# Patient Record
Sex: FEMALE | Race: BLACK OR AFRICAN AMERICAN | NOT HISPANIC OR LATINO | Employment: OTHER | URBAN - METROPOLITAN AREA
[De-identification: names, ages, dates, MRNs, and addresses within clinical notes are randomized per-mention and may not be internally consistent; named-entity substitution may affect disease eponyms.]

---

## 2023-04-24 LAB — CALCIDIOL (25 OH VITAMIN D3) (NG/ML) IN SER/PLAS: 31 NG/ML

## 2023-10-03 DIAGNOSIS — I10 HYPERTENSION, UNSPECIFIED TYPE: Primary | ICD-10-CM

## 2023-10-03 RX ORDER — TRIAMTERENE AND HYDROCHLOROTHIAZIDE 37.5; 25 MG/1; MG/1
1 CAPSULE ORAL DAILY
Qty: 90 CAPSULE | Refills: 0 | Status: SHIPPED | OUTPATIENT
Start: 2023-10-03 | End: 2024-01-02

## 2023-10-03 RX ORDER — TRIAMTERENE AND HYDROCHLOROTHIAZIDE 37.5; 25 MG/1; MG/1
1 CAPSULE ORAL DAILY
COMMUNITY
Start: 2019-10-28 | End: 2023-10-03 | Stop reason: SDUPTHER

## 2023-10-10 DIAGNOSIS — E55.9 VITAMIN D DEFICIENCY: ICD-10-CM

## 2023-10-10 DIAGNOSIS — E78.5 HYPERLIPIDEMIA, UNSPECIFIED HYPERLIPIDEMIA TYPE: Primary | ICD-10-CM

## 2023-10-31 ENCOUNTER — TELEPHONE (OUTPATIENT)
Dept: GASTROENTEROLOGY | Facility: CLINIC | Age: 68
End: 2023-10-31

## 2023-10-31 ENCOUNTER — LAB (OUTPATIENT)
Dept: LAB | Facility: LAB | Age: 68
End: 2023-10-31
Payer: MEDICARE

## 2023-10-31 DIAGNOSIS — R10.13 EPIGASTRIC PAIN: Primary | ICD-10-CM

## 2023-10-31 DIAGNOSIS — R10.13 EPIGASTRIC PAIN: ICD-10-CM

## 2023-10-31 PROCEDURE — 85652 RBC SED RATE AUTOMATED: CPT

## 2023-10-31 PROCEDURE — 36415 COLL VENOUS BLD VENIPUNCTURE: CPT

## 2023-10-31 PROCEDURE — 85027 COMPLETE CBC AUTOMATED: CPT

## 2023-10-31 PROCEDURE — 80053 COMPREHEN METABOLIC PANEL: CPT

## 2023-10-31 PROCEDURE — 83690 ASSAY OF LIPASE: CPT

## 2023-10-31 RX ORDER — OMEPRAZOLE 40 MG/1
40 CAPSULE, DELAYED RELEASE ORAL DAILY
Qty: 30 CAPSULE | Refills: 11 | Status: SHIPPED | OUTPATIENT
Start: 2023-10-31 | End: 2024-10-30

## 2023-10-31 NOTE — TELEPHONE ENCOUNTER
Phone - abdominal cramping x 1 month - intermittent - in epigastrium - worse after eating - no N or V - good appetite - BM's regular - rec: labs, omeprazole 40  mg daily - appt.

## 2023-11-01 LAB
ALBUMIN SERPL BCP-MCNC: 4.2 G/DL (ref 3.4–5)
ALP SERPL-CCNC: 112 U/L (ref 33–136)
ALT SERPL W P-5'-P-CCNC: 15 U/L (ref 7–45)
ANION GAP SERPL CALC-SCNC: 14 MMOL/L (ref 10–20)
AST SERPL W P-5'-P-CCNC: 14 U/L (ref 9–39)
BILIRUB SERPL-MCNC: 0.5 MG/DL (ref 0–1.2)
BUN SERPL-MCNC: 12 MG/DL (ref 6–23)
CALCIUM SERPL-MCNC: 9.5 MG/DL (ref 8.6–10.6)
CHLORIDE SERPL-SCNC: 102 MMOL/L (ref 98–107)
CO2 SERPL-SCNC: 28 MMOL/L (ref 21–32)
CREAT SERPL-MCNC: 0.93 MG/DL (ref 0.5–1.05)
ERYTHROCYTE [DISTWIDTH] IN BLOOD BY AUTOMATED COUNT: 12.6 % (ref 11.5–14.5)
ERYTHROCYTE [SEDIMENTATION RATE] IN BLOOD BY WESTERGREN METHOD: 35 MM/H (ref 0–30)
GFR SERPL CREATININE-BSD FRML MDRD: 67 ML/MIN/1.73M*2
GLUCOSE SERPL-MCNC: 98 MG/DL (ref 74–99)
HCT VFR BLD AUTO: 36.8 % (ref 36–46)
HGB BLD-MCNC: 11.8 G/DL (ref 12–16)
LIPASE SERPL-CCNC: 20 U/L (ref 9–82)
MCH RBC QN AUTO: 30.8 PG (ref 26–34)
MCHC RBC AUTO-ENTMCNC: 32.1 G/DL (ref 32–36)
MCV RBC AUTO: 96 FL (ref 80–100)
NRBC BLD-RTO: 0 /100 WBCS (ref 0–0)
PLATELET # BLD AUTO: 255 X10*3/UL (ref 150–450)
PMV BLD AUTO: 10.2 FL (ref 7.5–11.5)
POTASSIUM SERPL-SCNC: 3.7 MMOL/L (ref 3.5–5.3)
PROT SERPL-MCNC: 7.7 G/DL (ref 6.4–8.2)
RBC # BLD AUTO: 3.83 X10*6/UL (ref 4–5.2)
SODIUM SERPL-SCNC: 140 MMOL/L (ref 136–145)
WBC # BLD AUTO: 6.6 X10*3/UL (ref 4.4–11.3)

## 2023-11-02 NOTE — RESULT ENCOUNTER NOTE
"I called patient - \"much better\" - pain has resolved - still with \"gas, belching\" - on omeprazole 40 mg daily - rec: call me in 2 weeks "

## 2023-11-17 ENCOUNTER — TELEPHONE (OUTPATIENT)
Dept: GASTROENTEROLOGY | Facility: CLINIC | Age: 68
End: 2023-11-17
Payer: MEDICARE

## 2023-11-17 DIAGNOSIS — R10.9 ABDOMINAL PAIN, UNSPECIFIED ABDOMINAL LOCATION: Primary | ICD-10-CM

## 2023-11-17 NOTE — TELEPHONE ENCOUNTER
----- Message from Catherine Alaniz MA sent at 11/17/2023 10:03 AM EST -----  [phone]  calling with an update

## 2023-11-20 ENCOUNTER — ANCILLARY PROCEDURE (OUTPATIENT)
Dept: RADIOLOGY | Facility: CLINIC | Age: 68
End: 2023-11-20
Payer: MEDICARE

## 2023-11-20 DIAGNOSIS — R10.9 ABDOMINAL PAIN, UNSPECIFIED ABDOMINAL LOCATION: ICD-10-CM

## 2023-11-20 PROCEDURE — 74177 CT ABD & PELVIS W/CONTRAST: CPT | Performed by: RADIOLOGY

## 2023-11-20 PROCEDURE — 2550000001 HC RX 255 CONTRASTS: Performed by: INTERNAL MEDICINE

## 2023-11-20 PROCEDURE — 74177 CT ABD & PELVIS W/CONTRAST: CPT

## 2023-11-20 RX ADMIN — IOHEXOL 75 ML: 350 INJECTION, SOLUTION INTRAVENOUS at 10:29

## 2023-11-21 NOTE — RESULT ENCOUNTER NOTE
I called patient - reviewed CT - sludge in GB - had another episode of upper abd pain after eating last night - I rec: appt. With Wm re; ? Lap armando?

## 2023-11-30 ENCOUNTER — LAB (OUTPATIENT)
Dept: LAB | Facility: LAB | Age: 68
End: 2023-11-30
Payer: MEDICARE

## 2023-11-30 DIAGNOSIS — E55.9 VITAMIN D DEFICIENCY: ICD-10-CM

## 2023-11-30 DIAGNOSIS — E78.5 HYPERLIPIDEMIA, UNSPECIFIED HYPERLIPIDEMIA TYPE: ICD-10-CM

## 2023-11-30 LAB
25(OH)D3 SERPL-MCNC: 23 NG/ML (ref 30–100)
ALBUMIN SERPL BCP-MCNC: 4.3 G/DL (ref 3.4–5)
ALP SERPL-CCNC: 102 U/L (ref 33–136)
ALT SERPL W P-5'-P-CCNC: 17 U/L (ref 7–45)
ANION GAP SERPL CALC-SCNC: 13 MMOL/L (ref 10–20)
APPEARANCE UR: ABNORMAL
AST SERPL W P-5'-P-CCNC: 15 U/L (ref 9–39)
BACTERIA #/AREA URNS AUTO: ABNORMAL /HPF
BILIRUB SERPL-MCNC: 0.6 MG/DL (ref 0–1.2)
BILIRUB UR STRIP.AUTO-MCNC: NEGATIVE MG/DL
BUN SERPL-MCNC: 18 MG/DL (ref 6–23)
CALCIUM SERPL-MCNC: 9.7 MG/DL (ref 8.6–10.6)
CHLORIDE SERPL-SCNC: 104 MMOL/L (ref 98–107)
CHOLEST SERPL-MCNC: 199 MG/DL (ref 0–199)
CHOLESTEROL/HDL RATIO: 4.1
CO2 SERPL-SCNC: 29 MMOL/L (ref 21–32)
COLOR UR: YELLOW
CREAT SERPL-MCNC: 1.03 MG/DL (ref 0.5–1.05)
ERYTHROCYTE [DISTWIDTH] IN BLOOD BY AUTOMATED COUNT: 12.6 % (ref 11.5–14.5)
GFR SERPL CREATININE-BSD FRML MDRD: 59 ML/MIN/1.73M*2
GLUCOSE SERPL-MCNC: 111 MG/DL (ref 74–99)
GLUCOSE UR STRIP.AUTO-MCNC: NEGATIVE MG/DL
HCT VFR BLD AUTO: 38.9 % (ref 36–46)
HDLC SERPL-MCNC: 48.6 MG/DL
HGB BLD-MCNC: 12.6 G/DL (ref 12–16)
HOLD SPECIMEN: NORMAL
KETONES UR STRIP.AUTO-MCNC: NEGATIVE MG/DL
LDLC SERPL CALC-MCNC: 127 MG/DL
LEUKOCYTE ESTERASE UR QL STRIP.AUTO: ABNORMAL
MCH RBC QN AUTO: 30.7 PG (ref 26–34)
MCHC RBC AUTO-ENTMCNC: 32.4 G/DL (ref 32–36)
MCV RBC AUTO: 95 FL (ref 80–100)
MUCOUS THREADS #/AREA URNS AUTO: ABNORMAL /LPF
NITRITE UR QL STRIP.AUTO: NEGATIVE
NON HDL CHOLESTEROL: 150 MG/DL (ref 0–149)
NRBC BLD-RTO: 0 /100 WBCS (ref 0–0)
PH UR STRIP.AUTO: 6 [PH]
PLATELET # BLD AUTO: 247 X10*3/UL (ref 150–450)
POTASSIUM SERPL-SCNC: 4.2 MMOL/L (ref 3.5–5.3)
PROT SERPL-MCNC: 7 G/DL (ref 6.4–8.2)
PROT UR STRIP.AUTO-MCNC: NEGATIVE MG/DL
RBC # BLD AUTO: 4.1 X10*6/UL (ref 4–5.2)
RBC # UR STRIP.AUTO: ABNORMAL /UL
RBC #/AREA URNS AUTO: >20 /HPF
RENAL EPI CELLS #/AREA UR COMP ASSIST: ABNORMAL /HPF
SODIUM SERPL-SCNC: 142 MMOL/L (ref 136–145)
SP GR UR STRIP.AUTO: 1.02
SQUAMOUS #/AREA URNS AUTO: ABNORMAL /HPF
TRIGL SERPL-MCNC: 117 MG/DL (ref 0–149)
TSH SERPL-ACNC: 2.24 MIU/L (ref 0.44–3.98)
UROBILINOGEN UR STRIP.AUTO-MCNC: <2 MG/DL
VLDL: 23 MG/DL (ref 0–40)
WBC # BLD AUTO: 6.2 X10*3/UL (ref 4.4–11.3)
WBC #/AREA URNS AUTO: >50 /HPF

## 2023-11-30 PROCEDURE — 85027 COMPLETE CBC AUTOMATED: CPT

## 2023-11-30 PROCEDURE — 36415 COLL VENOUS BLD VENIPUNCTURE: CPT

## 2023-11-30 PROCEDURE — 84443 ASSAY THYROID STIM HORMONE: CPT

## 2023-11-30 PROCEDURE — 82306 VITAMIN D 25 HYDROXY: CPT

## 2023-11-30 PROCEDURE — 80053 COMPREHEN METABOLIC PANEL: CPT

## 2023-11-30 PROCEDURE — 81001 URINALYSIS AUTO W/SCOPE: CPT

## 2023-11-30 PROCEDURE — 80061 LIPID PANEL: CPT

## 2023-12-04 ENCOUNTER — APPOINTMENT (OUTPATIENT)
Dept: GASTROENTEROLOGY | Facility: CLINIC | Age: 68
End: 2023-12-04
Payer: MEDICARE

## 2023-12-04 ENCOUNTER — OFFICE VISIT (OUTPATIENT)
Dept: GASTROENTEROLOGY | Facility: CLINIC | Age: 68
End: 2023-12-04
Payer: MEDICARE

## 2023-12-04 VITALS — HEIGHT: 64 IN | WEIGHT: 198 LBS | BODY MASS INDEX: 33.8 KG/M2 | HEART RATE: 72 BPM

## 2023-12-04 DIAGNOSIS — R73.9 HYPERGLYCEMIA: Primary | ICD-10-CM

## 2023-12-04 DIAGNOSIS — E55.9 VITAMIN D DEFICIENCY: ICD-10-CM

## 2023-12-04 DIAGNOSIS — I10 PRIMARY HYPERTENSION: Primary | ICD-10-CM

## 2023-12-04 PROCEDURE — 1159F MED LIST DOCD IN RCRD: CPT | Performed by: INTERNAL MEDICINE

## 2023-12-04 PROCEDURE — 1126F AMNT PAIN NOTED NONE PRSNT: CPT | Performed by: INTERNAL MEDICINE

## 2023-12-04 PROCEDURE — 1036F TOBACCO NON-USER: CPT | Performed by: INTERNAL MEDICINE

## 2023-12-04 PROCEDURE — 99214 OFFICE O/P EST MOD 30 MIN: CPT | Performed by: INTERNAL MEDICINE

## 2023-12-04 RX ORDER — OXYCODONE AND ACETAMINOPHEN 5; 325 MG/1; MG/1
1 TABLET ORAL EVERY 6 HOURS PRN
COMMUNITY
End: 2023-12-21 | Stop reason: ALTCHOICE

## 2023-12-04 RX ORDER — ONDANSETRON 4 MG/1
4 TABLET, ORALLY DISINTEGRATING ORAL EVERY 12 HOURS PRN
COMMUNITY
End: 2023-12-21 | Stop reason: ALTCHOICE

## 2023-12-04 RX ORDER — FLUOCINOLONE ACETONIDE 0.11 MG/ML
1 OIL AURICULAR (OTIC) AS NEEDED
COMMUNITY
Start: 2023-03-16 | End: 2024-05-08 | Stop reason: ALTCHOICE

## 2023-12-04 RX ORDER — FLUTICASONE PROPIONATE 50 MCG
2 SPRAY, SUSPENSION (ML) NASAL DAILY
COMMUNITY
Start: 2023-03-16 | End: 2023-12-21 | Stop reason: ALTCHOICE

## 2023-12-04 RX ORDER — ESTRADIOL 0.1 MG/G
CREAM VAGINAL AS NEEDED
COMMUNITY
Start: 2023-04-25 | End: 2024-04-29 | Stop reason: SDUPTHER

## 2023-12-04 RX ORDER — HYDROCORTISONE 25 MG/G
1 CREAM TOPICAL AS NEEDED
COMMUNITY
End: 2024-05-08 | Stop reason: ALTCHOICE

## 2023-12-04 RX ORDER — CROMOLYN SODIUM 40 MG/ML
1 SOLUTION/ DROPS OPHTHALMIC 4 TIMES DAILY
COMMUNITY
End: 2023-12-21 | Stop reason: ALTCHOICE

## 2023-12-04 ASSESSMENT — ENCOUNTER SYMPTOMS
CARDIOVASCULAR NEGATIVE: 1
DIARRHEA: 0
MUSCULOSKELETAL NEGATIVE: 1
CONSTIPATION: 0
CONSTITUTIONAL NEGATIVE: 1
EYES NEGATIVE: 1
NEUROLOGICAL NEGATIVE: 1
PSYCHIATRIC NEGATIVE: 1
RESPIRATORY NEGATIVE: 1

## 2023-12-04 NOTE — PROGRESS NOTES
Subjective     History of Present Illness:   Heide Russ is a 68 y.o. female with PMHx of hypertension who presents to GI clinic for physical exam.     Review of Systems  Review of Systems   Constitutional: Negative.    HENT: Negative.     Eyes: Negative.    Respiratory: Negative.     Cardiovascular: Negative.         Home BP's not being monitored    Gastrointestinal:  Negative for constipation and diarrhea.        Still with episodes of epigastric pain.   Some relief with Mylanta.   Not much relief with omeprazole.   Has appt. With Dr. Burk 12/8.   Genitourinary: Negative.  Negative for vaginal discharge.   Musculoskeletal: Negative.         Occasional left knee pain   Neurological: Negative.    Psychiatric/Behavioral: Negative.         Allergies  No Known Allergies    Medications  Current Outpatient Medications   Medication Instructions    cromolyn (Opticrom) 4 % ophthalmic solution 1 drop, Both Eyes, 4 times daily    estradiol (Estrace) 0.01 % (0.1 mg/gram) vaginal cream PLACE DIME SIZED DROP OF OINTMENT VAGINALLY NIGHTLY FOR 2 WEEKS AND THEN 3 TIMES WEEKLY THEREAFTER    fluocinolone (DermOtic Oil) 0.01 % ear drops 1 drop, Each Ear, 2 times daily    fluticasone (Flonase) 50 mcg/actuation nasal spray 2 sprays, Each Nostril, Daily    hydrocortisone 2.5 % cream 1 Application, Topical, As needed    omeprazole (PRILOSEC) 40 mg, oral, Daily, Do not crush or chew.    ondansetron ODT (ZOFRAN-ODT) 4 mg, oral, Every 12 hours PRN    oxyCODONE-acetaminophen (Percocet) 5-325 mg tablet 1 tablet, oral, Every 6 hours PRN    triamterene-hydrochlorothiazid (Dyazide) 37.5-25 mg capsule 1 capsule, oral, Daily        Objective   Visit Vitals  Pulse 72   138/82    Physical Exam  HENT:      Nose: Nose normal.   Eyes:      Extraocular Movements: Extraocular movements intact.   Cardiovascular:      Rate and Rhythm: Regular rhythm. Tachycardia present.      Pulses: Normal pulses.   Pulmonary:      Breath sounds: Normal breath sounds.    Abdominal:      General: Bowel sounds are normal.      Palpations: Abdomen is soft.      Comments: Abdomen nontender    Neurological:      Mental Status: She is alert.           Lab Results   Component Value Date    WBC 6.2 11/30/2023    WBC 6.6 10/31/2023    WBC 7.6 11/14/2022    HGB 12.6 11/30/2023    HGB 11.8 (L) 10/31/2023    HGB 12.2 11/14/2022    HCT 38.9 11/30/2023    HCT 36.8 10/31/2023    HCT 36.9 11/14/2022     11/30/2023     10/31/2023     11/14/2022     Lab Results   Component Value Date     11/30/2023     10/31/2023     11/14/2022    K 4.2 11/30/2023    K 3.7 10/31/2023    K 4.1 11/14/2022     11/30/2023     10/31/2023     11/14/2022    CO2 29 11/30/2023    CO2 28 10/31/2023    CO2 26 11/14/2022    BUN 18 11/30/2023    BUN 12 10/31/2023    BUN 19 11/14/2022    CREATININE 1.03 11/30/2023    CREATININE 0.93 10/31/2023    CREATININE 1.00 11/14/2022    CALCIUM 9.7 11/30/2023    CALCIUM 9.5 10/31/2023    CALCIUM 9.5 11/14/2022    PROT 7.0 11/30/2023    PROT 7.7 10/31/2023    PROT 7.7 11/14/2022    BILITOT 0.6 11/30/2023    BILITOT 0.5 10/31/2023    BILITOT 0.8 11/14/2022    ALKPHOS 102 11/30/2023    ALKPHOS 112 10/31/2023    ALKPHOS 121 11/14/2022    ALT 17 11/30/2023    ALT 15 10/31/2023    ALT 17 11/14/2022    AST 15 11/30/2023    AST 14 10/31/2023    AST 15 11/14/2022    GLUCOSE 111 (H) 11/30/2023    GLUCOSE 98 10/31/2023    GLUCOSE 99 11/14/2022    CHOL 199 11/30/2023    CHOL 192 11/14/2022    CHOL 196 10/13/2021    LDLF 120 (H) 11/14/2022    LDLF 122 (H) 10/13/2021    LDLF 141 (H) 10/12/2020    CHHDL 4.1 11/30/2023    CHHDL 3.8 11/14/2022    CHHDL 4.0 10/13/2021     CT abdomen pelvis w IV contrast  Narrative: Interpreted By:  Luther Connor,   STUDY:  CT ABDOMEN PELVIS W IV CONTRAST;  11/20/2023 10:27 am      INDICATION:  Signs/Symptoms:abd pain, elevated sed rate.      COMPARISON:  Right upper quadrant ultrasound 10 February 2010; complete  abdominal  ultrasound 23 January 2008      ACCESSION NUMBER(S):  [id number]      ORDERING CLINICIAN:  VINNY KEY      TECHNIQUE:  CT of the abdomen and pelvis from the lung bases through the  symphysis pubis after the uneventful administration of intravenous  contrast (75 mL Omnipaque 350). No oral contrast.      FINDINGS:  LOWER CHEST: No acute airspace disease.      BONES: No acute skeletal findings.      LIVER: Lobular contour, but not overly cirrhotic. Not enlarged or  overtly fatty. No mass. All vessels patent      SPLEEN: Normal. No enlargement, mass or evidence of splenic vein  thrombosis.      PANCREAS: Normal. No CT evidence of acute or chronic pancreatitis. No  duct dilation. No mass.      GALLBLADDER: Borderline to mildly dilated. No wall thickening or  surrounding inflammatory change. Probability of layering sludge if  not tiny, like stones      BILE DUCTS: Normal. No biliary duct dilation.      ADRENAL GLANDS: Normal. No nodule or mass.      KIDNEYS AND URETERS: 27 mm exophytic upper pole and a smaller lower  pole cyst are both simple, Bosniak type 1, do not need follow-up. No  hydronephrosis on either side.  No solid mass on either side.  Symmetric enhancement.  No infarct or CT evidence of acute  pyelonephritis.  No substantial radiodense stone.  Tiny stones and  radiolucent stones could be occult on CT.      LYMPH NODES: Few mildly enlarged gastrohepatic ligament and aryan  hepatis nodes. The largest is the 13 mm short axis aryan hepatis node  on axial 37. No other remarkable lymph node stations      APPENDIX: Normal.  Not dilated, thick walled or in any other way  inflamed in appearance.  No inflammatory change about the appendix.      COLON: Normal. No sign of acute diverticulitis or other colitis. No  annular constricting mass.      SMALL BOWEL: Normal. No small bowel dilation or any other sign of  small bowel obstruction. No sign of active inflammatory bowel disease.      STOMACH /  DUODENUM: Grossly normal by CT which has limited  sensitivity and specificity for the stomach and duodenum.      RETROPERITONEUM: Normal.  No acute hemorrhage or inflammatory change.  Lymph nodes in a separate dedicated section.      OMENTUM, MESENTERY AND PERITONEAL SPACES:  Free intraperitoneal air: Negative  Free intraperitoneal fluid: Negative  Abscess: Negative  Other: n/a      URINARY BLADDER: Normal. No wall thickening, large diverticula,  radiodense stone or surrounding inflammatory change.      PELVIS: 18 mm simple cystic structure in deep left hemipelvis is  either associated with the left side of the vaginal cuff or possibly  the left ovary      VASCULATURE: No abdominal aortic or any other named major arterial  abdominal aneurysm. No high-grade atherosclerotic or other stenosis      ABDOMINAL WALL:  Hernia: Negative  Other: No acute or contributory abnormality.      Impression: The gallbladder is borderline to mildly dilated and contains sludge  if not tiny layering sand-like stones, but no gallbladder wall  thickening or surrounding pericholecystic inflammatory change      Incidental finding of mild adenopathy at the gastrohepatic ligament  and aryan hepatis. This may be reactive to the liver. The liver is  slightly lobular but not overtly nodular/cirrhotic. The liver is not  enlarged or overtly fatty. All hepatic vasculature is patent      No other potentially acute findings in the abdomen or pelvis      No acute appendicitis, diverticulitis, other colitis, bowel  obstruction, perforation, abscess or free fluid      No acute pancreatitis, hydronephrosis or any other acute solid organ  findings      No biliary duct dilation      MACRO:  Critical Finding:  See findings. Notification was initiated on  11/20/2023 at 11:26 am by  Luther Connor.  (**-YCF-**) Instructions:      Signed by: Luther Connor 11/20/2023 11:26 AM  Dictation workstation:   [id number]           Heide Russ is a 68 y.o. female for  physical exam.   Abdominal discomfort persists, has [place] appt. 12/8.   Glucose elevated, will reduce CHO intake, recheck in March.   Vit D low, will restart supplement, recheck in March.        Tin Scott MD

## 2023-12-08 ENCOUNTER — HOSPITAL ENCOUNTER (OUTPATIENT)
Facility: HOSPITAL | Age: 68
Setting detail: OUTPATIENT SURGERY
End: 2023-12-08
Attending: SURGERY | Admitting: SURGERY
Payer: MEDICARE

## 2023-12-08 ENCOUNTER — OFFICE VISIT (OUTPATIENT)
Dept: SURGERY | Facility: CLINIC | Age: 68
End: 2023-12-08
Payer: MEDICARE

## 2023-12-08 VITALS — WEIGHT: 194 LBS | BODY MASS INDEX: 32.32 KG/M2 | HEIGHT: 65 IN

## 2023-12-08 DIAGNOSIS — K80.20 CALCULUS OF GALLBLADDER WITHOUT CHOLECYSTITIS WITHOUT OBSTRUCTION: Primary | ICD-10-CM

## 2023-12-08 DIAGNOSIS — K80.20 CALCULUS OF GALLBLADDER WITHOUT CHOLECYSTITIS WITHOUT OBSTRUCTION: ICD-10-CM

## 2023-12-08 PROCEDURE — 1036F TOBACCO NON-USER: CPT | Performed by: SURGERY

## 2023-12-08 PROCEDURE — 1126F AMNT PAIN NOTED NONE PRSNT: CPT | Performed by: SURGERY

## 2023-12-08 PROCEDURE — 1159F MED LIST DOCD IN RCRD: CPT | Performed by: SURGERY

## 2023-12-08 PROCEDURE — 99203 OFFICE O/P NEW LOW 30 MIN: CPT | Performed by: SURGERY

## 2023-12-08 ASSESSMENT — PAIN SCALES - GENERAL: PAINLEVEL: 0-NO PAIN

## 2023-12-08 NOTE — LETTER
"December 8, 2023     Tin Scott MD  [address] [facility]  [facility] [address]    Patient: Heide Russ   YOB: 1955   Date of Visit: 12/8/2023       Dear Dr. Tin Scott MD:    Thank you for referring Heide Russ to me for evaluation. Below are my notes for this consultation.  If you have questions, please do not hesitate to call me. I look forward to following your patient along with you.       Sincerely,     Barrett Burk MD      CC: No Recipients  ______________________________________________________________________________________    Subjective   Patient ID: Heide Russ is a 68 y.o. female who presents for Abdominal Pain.  HPI  Ms. Russ is a very pleasant 68-year-old female who was referred for evaluation treatment of symptomatic gallstones.  Over the past several months he has noted intermittent epigastric abdominal pain that sometimes radiates to the chest.  She has had some nausea with this.  Her symptoms are made worse after meals.  Roughly 1 month ago she had a particularly bad attack after eating some cheese.  She was sent for a CT scan that shows gallstones with an enlarged gallbladder.  She feels that her symptoms of pain after eating have become more frequent of late.  She is oftentimes afraid to eat. These symptoms are different from her \"heartburn\"  and prescribed PPIs have not been helping.        Review of Systems  On review of systems patient denies any weight loss, fever, change in bowel habits, melena, hematochezia, coronary artery disease, hypertension, TIA/CVA, bleeding, jaundice, pancreatitis, hepatitis.    Patient does not smoke.    Past Medical History:   Diagnosis Date   • Encounter for fitting and adjustment of other specified devices 03/17/2021    Fitting and adjustment of pessary   • Encounter for follow-up examination after completed treatment for conditions other than malignant neoplasm 06/22/2021    Postop check "   • Presence of urogenital implants 03/17/2021    Vaginal pessary present    HTN    Past Surgical History:   Procedure Laterality Date   • HYSTERECTOMY  05/24/2013    Hysterectomy   • OTHER SURGICAL HISTORY  12/28/2020    Breast reduction   • OTHER SURGICAL HISTORY  05/25/2021    Sacrospinous fixation of vaginal vault   • OTHER SURGICAL HISTORY  05/25/2021    Cystocele repair            Objective     Mild obese,  well-developed. In no distress  Alert and oriented x 3  Lungs are clear to auscultation bilaterally.  Cardiac exam is regular rhythm and rate.  Abdomen is soft nontender nondistended.  Neurologic exam is without focal deficit.         CT ABDOMEN PELVIS W IV CONTRAST  11/27/2023  - Impression -  The gallbladder is borderline to mildly dilated and contains sludge  if not tiny layering sand-like stones, but no gallbladder wall  thickening or surrounding pericholecystic inflammatory change    Incidental finding of mild adenopathy at the gastrohepatic ligament  and aryan hepatis. This may be reactive to the liver. The liver is  slightly lobular but not overtly nodular/cirrhotic. The liver is not  enlarged or overtly fatty. All hepatic vasculature is patent    No other potentially acute findings in the abdomen or pelvis    No acute appendicitis, diverticulitis, other colitis, bowel  obstruction, perforation, abscess or free fluid    No acute pancreatitis, hydronephrosis or any other acute solid organ  findings    No biliary duct dilation      Lab Results   Component Value Date    GLUCOSE 111 (H) 11/30/2023     11/30/2023    K 4.2 11/30/2023     11/30/2023    CO2 29 11/30/2023    ANIONGAP 13 11/30/2023    BUN 18 11/30/2023    CREATININE 1.03 11/30/2023    EGFR 59 (L) 11/30/2023    CALCIUM 9.7 11/30/2023    ALBUMIN 4.3 11/30/2023    ALKPHOS 102 11/30/2023    PROT 7.0 11/30/2023    AST 15 11/30/2023    BILITOT 0.6 11/30/2023    ALT 17 11/30/2023         Assessment/Plan   Diagnoses and all orders for this  visit:  Calculus of gallbladder without cholecystitis without obstruction  -     Case Request Operating Room: Cholecystectomy Laparoscopy; Standing    Impression:   68-year-old female with recurrent epigastric abdominal pain and nausea made worse after meals.  CT scan shows gallstones.  I recommended laparoscopic cholecystectomy.  We discussed the procedure to include risk, benefits and alternatives.  Also discussed with her son who is on the phone.  She agrees to surgery which is tentatively scheduled for early next month.  She instructed to give me a call should she have worsening of her symptoms and we can get her in sooner

## 2023-12-08 NOTE — PROGRESS NOTES
"Subjective   Patient ID: Heide Russ is a 68 y.o. female who presents for Abdominal Pain.  HPI  Ms. Russ is a very pleasant 68-year-old female who was referred for evaluation treatment of symptomatic gallstones.  Over the past several months he has noted intermittent epigastric abdominal pain that sometimes radiates to the chest.  She has had some nausea with this.  Her symptoms are made worse after meals.  Roughly 1 month ago she had a particularly bad attack after eating some cheese.  She was sent for a CT scan that shows gallstones with an enlarged gallbladder.  She feels that her symptoms of pain after eating have become more frequent of late.  She is oftentimes afraid to eat. These symptoms are different from her \"heartburn\"  and prescribed PPIs have not been helping.        Review of Systems  On review of systems patient denies any weight loss, fever, change in bowel habits, melena, hematochezia, coronary artery disease, hypertension, TIA/CVA, bleeding, jaundice, pancreatitis, hepatitis.    Patient does not smoke.    Past Medical History:   Diagnosis Date    Encounter for fitting and adjustment of other specified devices 03/17/2021    Fitting and adjustment of pessary    Encounter for follow-up examination after completed treatment for conditions other than malignant neoplasm 06/22/2021    Postop check    Presence of urogenital implants 03/17/2021    Vaginal pessary present    HTN    Past Surgical History:   Procedure Laterality Date    HYSTERECTOMY  05/24/2013    Hysterectomy    OTHER SURGICAL HISTORY  12/28/2020    Breast reduction    OTHER SURGICAL HISTORY  05/25/2021    Sacrospinous fixation of vaginal vault    OTHER SURGICAL HISTORY  05/25/2021    Cystocele repair            Objective     Mild obese,  well-developed. In no distress  Alert and oriented x 3  Lungs are clear to auscultation bilaterally.  Cardiac exam is regular rhythm and rate.  Abdomen is soft nontender nondistended.  Neurologic exam is " without focal deficit.         CT ABDOMEN PELVIS W IV CONTRAST  11/27/2023  - Impression -  The gallbladder is borderline to mildly dilated and contains sludge  if not tiny layering sand-like stones, but no gallbladder wall  thickening or surrounding pericholecystic inflammatory change    Incidental finding of mild adenopathy at the gastrohepatic ligament  and aryan hepatis. This may be reactive to the liver. The liver is  slightly lobular but not overtly nodular/cirrhotic. The liver is not  enlarged or overtly fatty. All hepatic vasculature is patent    No other potentially acute findings in the abdomen or pelvis    No acute appendicitis, diverticulitis, other colitis, bowel  obstruction, perforation, abscess or free fluid    No acute pancreatitis, hydronephrosis or any other acute solid organ  findings    No biliary duct dilation      Lab Results   Component Value Date    GLUCOSE 111 (H) 11/30/2023     11/30/2023    K 4.2 11/30/2023     11/30/2023    CO2 29 11/30/2023    ANIONGAP 13 11/30/2023    BUN 18 11/30/2023    CREATININE 1.03 11/30/2023    EGFR 59 (L) 11/30/2023    CALCIUM 9.7 11/30/2023    ALBUMIN 4.3 11/30/2023    ALKPHOS 102 11/30/2023    PROT 7.0 11/30/2023    AST 15 11/30/2023    BILITOT 0.6 11/30/2023    ALT 17 11/30/2023         Assessment/Plan   Diagnoses and all orders for this visit:  Calculus of gallbladder without cholecystitis without obstruction  -     Case Request Operating Room: Cholecystectomy Laparoscopy; Standing    Impression:   68-year-old female with recurrent epigastric abdominal pain and nausea made worse after meals.  CT scan shows gallstones.  I recommended laparoscopic cholecystectomy.  We discussed the procedure to include risk, benefits and alternatives.  Also discussed with her son who is on the phone.  She agrees to surgery which is tentatively scheduled for early next month.  She instructed to give me a call should she have worsening of her symptoms and we can get  her in sooner

## 2023-12-12 ENCOUNTER — TELEPHONE (OUTPATIENT)
Dept: GASTROENTEROLOGY | Facility: CLINIC | Age: 68
End: 2023-12-12
Payer: MEDICARE

## 2023-12-12 NOTE — TELEPHONE ENCOUNTER
Phone - some improvement in upper abd pain on omeprazole, with more careful diet - I rec: increase to 40 mg daily, call me after Xmas

## 2023-12-12 NOTE — TELEPHONE ENCOUNTER
----- Message from Catherine Alaniz MA sent at 12/12/2023 10:07 AM EST -----  [phone] please call regarding Gallbladder surgery

## 2023-12-21 ENCOUNTER — TELEMEDICINE CLINICAL SUPPORT (OUTPATIENT)
Dept: PREADMISSION TESTING | Facility: HOSPITAL | Age: 68
End: 2023-12-21
Payer: MEDICARE

## 2023-12-21 RX ORDER — CHOLECALCIFEROL (VITAMIN D3) 25 MCG
1000 TABLET ORAL DAILY
COMMUNITY
End: 2024-05-08 | Stop reason: ALTCHOICE

## 2023-12-21 RX ORDER — DEXTROMETHORPHAN HYDROBROMIDE, GUAIFENESIN 5; 100 MG/5ML; MG/5ML
650 LIQUID ORAL EVERY 8 HOURS PRN
COMMUNITY
End: 2024-05-08 | Stop reason: ALTCHOICE

## 2023-12-21 RX ORDER — SIMETHICONE 80 MG
80 TABLET,CHEWABLE ORAL EVERY 6 HOURS PRN
COMMUNITY
End: 2024-05-08 | Stop reason: ALTCHOICE

## 2023-12-22 ENCOUNTER — ANCILLARY PROCEDURE (OUTPATIENT)
Dept: RADIOLOGY | Facility: CLINIC | Age: 68
End: 2023-12-22
Payer: MEDICARE

## 2023-12-22 DIAGNOSIS — Z00.00 ENCOUNTER FOR GENERAL ADULT MEDICAL EXAMINATION WITHOUT ABNORMAL FINDINGS: ICD-10-CM

## 2023-12-22 PROCEDURE — 77063 BREAST TOMOSYNTHESIS BI: CPT | Mod: BILATERAL PROCEDURE | Performed by: RADIOLOGY

## 2023-12-22 PROCEDURE — 77067 SCR MAMMO BI INCL CAD: CPT

## 2023-12-22 PROCEDURE — 77067 SCR MAMMO BI INCL CAD: CPT | Mod: BILATERAL PROCEDURE | Performed by: RADIOLOGY

## 2023-12-26 ENCOUNTER — PRE-ADMISSION TESTING (OUTPATIENT)
Dept: PREADMISSION TESTING | Facility: HOSPITAL | Age: 68
End: 2023-12-26
Payer: MEDICARE

## 2023-12-30 DIAGNOSIS — I10 HYPERTENSION, UNSPECIFIED TYPE: ICD-10-CM

## 2024-01-02 RX ORDER — TRIAMTERENE AND HYDROCHLOROTHIAZIDE 37.5; 25 MG/1; MG/1
1 CAPSULE ORAL DAILY
Qty: 90 CAPSULE | Refills: 2 | Status: SHIPPED | OUTPATIENT
Start: 2024-01-02

## 2024-01-12 ENCOUNTER — TELEPHONE (OUTPATIENT)
Dept: GASTROENTEROLOGY | Facility: CLINIC | Age: 69
End: 2024-01-12
Payer: MEDICARE

## 2024-01-12 NOTE — TELEPHONE ENCOUNTER
Phone - she cancelled her lap armando - has been feeling better, no recurrence of the episodes of abd pain - following a low fat diet - will stay on omeprazole 40 mg daily, call me early February

## 2024-01-12 NOTE — TELEPHONE ENCOUNTER
----- Message from Kayleen Aragon CMA sent at 1/12/2024  1:22 PM EST -----  Wants to update you please call   [phone]

## 2024-03-05 ENCOUNTER — TELEPHONE (OUTPATIENT)
Dept: GASTROENTEROLOGY | Facility: CLINIC | Age: 69
End: 2024-03-05
Payer: MEDICARE

## 2024-03-05 DIAGNOSIS — Z80.0 FAMILY HISTORY OF COLON CANCER: ICD-10-CM

## 2024-03-05 DIAGNOSIS — K21.00 GASTROESOPHAGEAL REFLUX DISEASE WITH ESOPHAGITIS WITHOUT HEMORRHAGE: Primary | ICD-10-CM

## 2024-03-05 NOTE — TELEPHONE ENCOUNTER
----- Message from Catherine Alaniz MA sent at 3/5/2024  9:49 AM EST -----  Please put an order in for Colonoscopy also please call regarding reflux [phone]

## 2024-03-06 DIAGNOSIS — Z80.0 FAMILY HISTORY OF MALIGNANT NEOPLASM OF GASTROINTESTINAL TRACT: ICD-10-CM

## 2024-03-06 RX ORDER — SOD SULF/POT CHLORIDE/MAG SULF 1.479 G
TABLET ORAL
Qty: 24 TABLET | Refills: 0 | Status: SHIPPED | OUTPATIENT
Start: 2024-03-06 | End: 2024-05-08 | Stop reason: ALTCHOICE

## 2024-03-28 PROBLEM — J32.9 SINUSITIS: Status: ACTIVE | Noted: 2024-03-28

## 2024-03-28 PROBLEM — L57.9 SKIN CHANGES DUE TO CHRONIC EXPOSURE TO NONIONIZING RADIATION, UNSPECIFIED: Status: ACTIVE | Noted: 2022-06-01

## 2024-03-28 PROBLEM — I77.810 DILATED AORTIC ROOT (CMS-HCC): Status: ACTIVE | Noted: 2024-03-28

## 2024-03-28 PROBLEM — E55.9 VITAMIN D DEFICIENCY: Status: ACTIVE | Noted: 2023-11-30

## 2024-03-28 PROBLEM — N89.8 GRANULATION TISSUE OF VAGINA: Status: ACTIVE | Noted: 2024-03-28

## 2024-03-28 PROBLEM — D18.01 HEMANGIOMA OF SKIN AND SUBCUTANEOUS TISSUE: Status: ACTIVE | Noted: 2022-06-01

## 2024-03-28 PROBLEM — N99.3 VAGINAL VAULT PROLAPSE, POSTHYSTERECTOMY: Status: ACTIVE | Noted: 2023-04-25

## 2024-03-28 PROBLEM — J30.2 SEASONAL ALLERGIC RHINITIS: Status: ACTIVE | Noted: 2020-07-28

## 2024-03-28 PROBLEM — M84.369A STRESS FRACTURE OF TIBIA: Status: ACTIVE | Noted: 2024-03-28

## 2024-03-28 PROBLEM — L85.3 XEROSIS CUTIS: Status: ACTIVE | Noted: 2022-06-01

## 2024-03-28 PROBLEM — H60.549 ECZEMA OF EXTERNAL AUDITORY CANAL: Status: ACTIVE | Noted: 2021-03-02

## 2024-03-28 PROBLEM — L81.4 OTHER MELANIN HYPERPIGMENTATION: Status: ACTIVE | Noted: 2022-06-01

## 2024-03-28 PROBLEM — L30.9 DERMATITIS, UNSPECIFIED: Status: ACTIVE | Noted: 2022-06-01

## 2024-03-28 PROBLEM — E78.5 HYPERLIPIDEMIA: Status: ACTIVE | Noted: 2024-03-28

## 2024-03-28 PROBLEM — R10.2 FEMALE PELVIC PAIN: Status: ACTIVE | Noted: 2023-04-25

## 2024-03-28 PROBLEM — N81.89 PELVIC FLOOR WEAKNESS: Status: ACTIVE | Noted: 2024-03-28

## 2024-03-28 PROBLEM — Z86.79 HISTORY OF HYPERTENSION: Status: ACTIVE | Noted: 2024-03-28

## 2024-03-28 PROBLEM — R73.9 HYPERGLYCEMIA: Status: ACTIVE | Noted: 2024-03-28

## 2024-03-28 PROBLEM — L82.1 OTHER SEBORRHEIC KERATOSIS: Status: ACTIVE | Noted: 2022-06-01

## 2024-04-02 ENCOUNTER — OFFICE VISIT (OUTPATIENT)
Dept: GASTROENTEROLOGY | Facility: EXTERNAL LOCATION | Age: 69
End: 2024-04-02
Payer: MEDICARE

## 2024-04-02 ENCOUNTER — TELEPHONE (OUTPATIENT)
Dept: GASTROENTEROLOGY | Facility: CLINIC | Age: 69
End: 2024-04-02

## 2024-04-02 DIAGNOSIS — Z80.0 FAMILY HISTORY OF COLON CANCER: ICD-10-CM

## 2024-04-02 DIAGNOSIS — Z80.0 FAMILY HISTORY OF MALIGNANT NEOPLASM OF GASTROINTESTINAL TRACT: ICD-10-CM

## 2024-04-02 DIAGNOSIS — K21.00 GASTROESOPHAGEAL REFLUX DISEASE WITH ESOPHAGITIS WITHOUT HEMORRHAGE: ICD-10-CM

## 2024-04-02 DIAGNOSIS — K57.30 DIVERTICULOSIS OF LARGE INTESTINE WITHOUT DIVERTICULITIS: ICD-10-CM

## 2024-04-02 DIAGNOSIS — I10 ESSENTIAL HYPERTENSION, BENIGN: ICD-10-CM

## 2024-04-02 DIAGNOSIS — D49.0 GASTRIC NEOPLASM: ICD-10-CM

## 2024-04-02 DIAGNOSIS — D12.2 BENIGN NEOPLASM OF ASCENDING COLON: ICD-10-CM

## 2024-04-02 DIAGNOSIS — R10.13 ABDOMINAL PAIN, EPIGASTRIC: ICD-10-CM

## 2024-04-02 DIAGNOSIS — K31.89 SUBEPITHELIAL MASS OF STOMACH: Primary | ICD-10-CM

## 2024-04-02 DIAGNOSIS — D12.4 BENIGN NEOPLASM OF DESCENDING COLON: ICD-10-CM

## 2024-04-02 DIAGNOSIS — K31.7 GASTRIC POLYP: Primary | ICD-10-CM

## 2024-04-02 PROCEDURE — 88305 TISSUE EXAM BY PATHOLOGIST: CPT

## 2024-04-02 PROCEDURE — 45385 COLONOSCOPY W/LESION REMOVAL: CPT | Performed by: INTERNAL MEDICINE

## 2024-04-02 PROCEDURE — 88305 TISSUE EXAM BY PATHOLOGIST: CPT | Performed by: STUDENT IN AN ORGANIZED HEALTH CARE EDUCATION/TRAINING PROGRAM

## 2024-04-02 PROCEDURE — 43235 EGD DIAGNOSTIC BRUSH WASH: CPT | Performed by: INTERNAL MEDICINE

## 2024-04-02 NOTE — PROGRESS NOTES
Continued Stay Note   [place]     Patient Name: Odalis Solo  MRN: [id number]  Today's Date: 9/22/2023    Admit Date: 9/21/2023    Plan: home with [facility]- ordered and accepted.   Discharge Plan       Row Name 09/22/23 1055       Plan    Plan home with [facility]- ordered and accepted.    Plan Comments  set up per ortho navigator.  has RW at home.  verified with HH that all was good with them.                      Expected Discharge Date and Time       Expected Discharge Date Expected Discharge Time    Sep 22, 2023               Leni Mcallister RN     See provation

## 2024-04-02 NOTE — PROGRESS NOTES
Phone - EUS ordered d/t apparent submucosal mass - will stay on omeprazole 40 mg every other day -

## 2024-04-03 ENCOUNTER — LAB REQUISITION (OUTPATIENT)
Dept: LAB | Facility: HOSPITAL | Age: 69
End: 2024-04-03
Payer: MEDICARE

## 2024-04-08 LAB
LABORATORY COMMENT REPORT: NORMAL
PATH REPORT.FINAL DX SPEC: NORMAL
PATH REPORT.GROSS SPEC: NORMAL
PATH REPORT.RELEVANT HX SPEC: NORMAL
PATH REPORT.TOTAL CANCER: NORMAL

## 2024-04-16 ENCOUNTER — APPOINTMENT (OUTPATIENT)
Dept: UROLOGY | Facility: CLINIC | Age: 69
End: 2024-04-16
Payer: MEDICARE

## 2024-04-19 ENCOUNTER — APPOINTMENT (OUTPATIENT)
Dept: GASTROENTEROLOGY | Facility: EXTERNAL LOCATION | Age: 69
End: 2024-04-19
Payer: MEDICARE

## 2024-04-24 NOTE — PROGRESS NOTES
Subjective   Patient ID: Heide Russ is a 68 y.o. female who presents for yearly pelvic exam  HPI  68-year-old having undergone 5/10/2021 sacrospinous ligament suspension, anterior repair, perineorrhaphy, and cystoscopy with history of stage III vaginal vault prolapse, pelvic floor weakness and mild pain, and vaginal atrophy having failed pessary management with history of small area of granulation tissue at the apex of the vagina.     The patient  is overall very satisfied with her surgery. She denies any vaginal complaints, no abnormal discharge. She is sexually active but notes dyspareunia, just at the opening. She denies any vaginal dryness or irritation but note some bleeding. She is not utilizing any vaginal estrogen therapy.      She denies any lower urinary tract complaints, denies any significant urinary urgency or frequency complaints.      She denies any bowel related complaints, no fecal or flatal incontinence.     She has no other complaints.    From Previous note  67-year-old having undergone 5/10/2021 sacrospinous ligament suspension, anterior repair, perineorrhaphy, and cystoscopy with history of stage III vaginal vault prolapse, pelvic floor weakness and mild pain, and vaginal atrophy having failed pessary management with history of small area of granulation tissue at the apex of the vagina.     The patient was last seen in April 2022, she is overall very satisfied with her surgery. She denies any vaginal complaints, no abnormal discharge. She is sexually active but notes dyspareunia, just at the opening. She denies any vaginal dryness or irritation but note some bleeding. She is not utilizing any vaginal estrogen therapy.      She denies any lower urinary tract complaints, denies any significant urinary urgency or frequency complaints.      She denies any bowel related complaints, no fecal or flatal incontinence.     She has no other complaints.           From previous note  66-year-old having  "undergone 5/10/2021 sacrospinous ligament suspension, anterior repair, perineorrhaphy, and cystoscopy with history of symptomatic pelvic organ prolapse having failed pessary management.     Patient is overall very satisfied with her surgery. She denies any prolapse complaints. She continues to titrate her MiraLAX for soft bowel movement. She has noted rare episodes of urinary urgency but denies any incontinence. She denies any vaginal complaints at this time. She did note an episode of vaginal spotting roughly 4 months ago that resolved spontaneously.     She is not utilizing any vaginal estrogen therapy.     She has no other complaints.     From previous note  65-year-old presenting as a referral from Dr. Armstrong with complaints of vaginal prolapse.     The patient has noted a roughly 5-year history of episodic vaginal bulge symptoms. However since October of this year, over the last 2 months, she has noted persistent and constant vaginal pressure and pulling. She \"always feels that something is there\". She denies any abnormal vaginal bleeding. She was recently started 3 weeks ago on vaginal estrogen therapy and feels that this \"helps\". She is sexually active. She denies any dyspareunia.     She denies any significant urinary urgency or frequency. She notes 2 episodes of nocturia. She denies enuresis. She denies any stress urinary incontinence. She denies a history of nephrolithiasis, chronic urinary tract infections, or any gross hematuria.     She has regular bowel movements. She denies any fecal or flatal incontinence.     She has no other complaints.        Review of Systems  Constitutional: No fever, No chills and No fatigue.   Eyes: No vision problems and No dryness of the eyes.   ENT: No dry mouth, No hearing loss and No nosebleeds.   Cardiovascular: No chest pain, No palpitations and No orthopnea.   Respiratory: No shortness of breath, No cough and No wheezing.   Gastrointestinal: No abdominal pain, No " constipation, No nausea, No diarrhea, No vomiting and No melena.   Genitourinary: As noted in HPI.   Musculoskeletal: No back pain, No myalgias, No muscle weakness, No joint swelling and No leg edema.   Integumentary: No rashes, No skin lesion and No itching.   Neurological: No headache, No numbness and No dizziness.   Psychiatric: No sleep disturbances, No anxiety and No depression.   Endocrine: No hot flashes, No loss of hair and No hirsutism.   Hematologic/Lymphatic: No swollen glands, No tendency for easy bleeding and No tendency for easy bruising.   All other systems have been reviewed and are negative for complaint.        Objective   Physical Exam  PHYSICAL EXAMINATION:  No LMP recorded. Patient is postmenopausal.  There is no height or weight on file to calculate BMI.  There were no vitals taken for this visit.  General Appearance: well appearing  Neuro: Alert and oriented   HEENT: mucous membranes moist, neck supple  Resp: No respiratory distress, normal work of breathing  MSK: normal range of motion, gait appropriate    Pelvic:  Genitourinary:  normal external genitalia, Bartholin's glands negative, Gumlog's glands negative  Urethra   normal meatus, non-tender, no periurethral mass  Vaginal mucosa  normal, well-healed vaginal apex  Cervix surgically absent  Uterus surgically absent  Adnexae  negative nontender, no masses  Atrophy positive    CST negative  Pelvic floor muscle contraction  2/5, no pain throughout exam    POP-Q (in supine position):       Aa -1     Ba -1     C -9              gh 3     pb 4     tvl 9              Ap -3     Bp -3     D     Rectal: no hemorrhoids, fissures or masses    Assessment/Plan     68-year-old having undergone 5/10/2021 sacrospinous ligament suspension, anterior repair, perineorrhaphy, and cystoscopy with history of stage III vaginal vault prolapse, pelvic floor weakness and mild pain, and vaginal atrophy having failed pessary management with history of small area of  granulation tissue at the apex of the vagina that has since resolved.     1. The patient is asymptomatic from a prolapse complaints. She is overall very satisfied with the surgery.      2. We discussed the patient's vaginal atrophy. She had originally stopped her estradiol therapy. We did discuss the safety, efficacy, proper utilization of her vaginal estrogen therapy given her recent dyspareunia complaints. She was noted to have no pain to palpation on exam today 4/29/2024. The patient will restart her vaginal estrogen therapy now.     3. We discussed her constipation. The patient continue daily fiber therapy and titrate MiraLAX to a soft bowel movement every day to 2 days.     4. The patient will follow up in 1 year for her yearly pelvic exam. She will proceed with mammography in December 2023 and we will continue her regular yearly GYN care through my office.     OPAL Hernandez MD     Scribe Attestation  By signing my name below, IKathryn Scribe attest that this documentation has been prepared under the direction and in the presence of Satya Hernandez MD. All medical record entries made by the Scribe were at my direction or personally dictated by me. I have reviewed the chart and agree that the record accurately reflects my personal performance of the history, physical exam, discussion and plan.

## 2024-04-29 ENCOUNTER — OFFICE VISIT (OUTPATIENT)
Dept: UROLOGY | Facility: CLINIC | Age: 69
End: 2024-04-29
Payer: MEDICARE

## 2024-04-29 VITALS
SYSTOLIC BLOOD PRESSURE: 148 MMHG | BODY MASS INDEX: 32.12 KG/M2 | DIASTOLIC BLOOD PRESSURE: 73 MMHG | WEIGHT: 193 LBS | HEART RATE: 67 BPM

## 2024-04-29 DIAGNOSIS — N81.89 PELVIC FLOOR WEAKNESS: ICD-10-CM

## 2024-04-29 DIAGNOSIS — N99.3 VAGINAL VAULT PROLAPSE, POSTHYSTERECTOMY: Primary | ICD-10-CM

## 2024-04-29 DIAGNOSIS — N95.2 VAGINAL ATROPHY: ICD-10-CM

## 2024-04-29 PROCEDURE — 99214 OFFICE O/P EST MOD 30 MIN: CPT | Performed by: OBSTETRICS & GYNECOLOGY

## 2024-04-29 PROCEDURE — 1159F MED LIST DOCD IN RCRD: CPT | Performed by: OBSTETRICS & GYNECOLOGY

## 2024-04-29 PROCEDURE — 1036F TOBACCO NON-USER: CPT | Performed by: OBSTETRICS & GYNECOLOGY

## 2024-04-29 PROCEDURE — 1160F RVW MEDS BY RX/DR IN RCRD: CPT | Performed by: OBSTETRICS & GYNECOLOGY

## 2024-04-29 RX ORDER — ESTRADIOL 0.1 MG/G
CREAM VAGINAL
Qty: 42.5 G | Refills: 2 | Status: SHIPPED | OUTPATIENT
Start: 2024-04-29 | End: 2024-05-08 | Stop reason: ALTCHOICE

## 2024-04-29 NOTE — PATIENT INSTRUCTIONS
Please restart your vaginal estrogen therapy nightly for 2 weeks and then 3 times a week thereafter.  Do not use the plastic applicator and only use your fingertip.    Please follow-up in 1 year for your regularly scheduled gynecological follow-up.    Please contact the clinic with any questions or concerns.    [phone]

## 2024-05-15 ENCOUNTER — HOSPITAL ENCOUNTER (OUTPATIENT)
Dept: GASTROENTEROLOGY | Facility: HOSPITAL | Age: 69
Discharge: HOME | End: 2024-05-15
Payer: MEDICARE

## 2024-05-15 ENCOUNTER — ANESTHESIA EVENT (OUTPATIENT)
Dept: GASTROENTEROLOGY | Facility: HOSPITAL | Age: 69
End: 2024-05-15
Payer: MEDICARE

## 2024-05-15 ENCOUNTER — ANESTHESIA (OUTPATIENT)
Dept: GASTROENTEROLOGY | Facility: HOSPITAL | Age: 69
End: 2024-05-15
Payer: MEDICARE

## 2024-05-15 VITALS
RESPIRATION RATE: 20 BRPM | WEIGHT: 190.26 LBS | TEMPERATURE: 97.5 F | OXYGEN SATURATION: 100 % | HEIGHT: 65 IN | DIASTOLIC BLOOD PRESSURE: 81 MMHG | BODY MASS INDEX: 31.7 KG/M2 | SYSTOLIC BLOOD PRESSURE: 134 MMHG | HEART RATE: 61 BPM

## 2024-05-15 DIAGNOSIS — K31.89 SUBEPITHELIAL MASS OF STOMACH: ICD-10-CM

## 2024-05-15 PROCEDURE — 3700000001 HC GENERAL ANESTHESIA TIME - INITIAL BASE CHARGE

## 2024-05-15 PROCEDURE — 2500000004 HC RX 250 GENERAL PHARMACY W/ HCPCS (ALT 636 FOR OP/ED): Performed by: NURSE ANESTHETIST, CERTIFIED REGISTERED

## 2024-05-15 PROCEDURE — 7100000010 HC PHASE TWO TIME - EACH INCREMENTAL 1 MINUTE

## 2024-05-15 PROCEDURE — 2500000005 HC RX 250 GENERAL PHARMACY W/O HCPCS: Performed by: NURSE ANESTHETIST, CERTIFIED REGISTERED

## 2024-05-15 PROCEDURE — 43259 EGD US EXAM DUODENUM/JEJUNUM: CPT | Performed by: INTERNAL MEDICINE

## 2024-05-15 PROCEDURE — 7100000009 HC PHASE TWO TIME - INITIAL BASE CHARGE

## 2024-05-15 PROCEDURE — A43237 PR ESOPHAGOGASTRODUODENOSCOPY US SCOPE W/ADJ STRXRS: Performed by: ANESTHESIOLOGY

## 2024-05-15 PROCEDURE — 43237 ENDOSCOPIC US EXAM ESOPH: CPT | Performed by: INTERNAL MEDICINE

## 2024-05-15 PROCEDURE — A43237 PR ESOPHAGOGASTRODUODENOSCOPY US SCOPE W/ADJ STRXRS: Performed by: NURSE ANESTHETIST, CERTIFIED REGISTERED

## 2024-05-15 PROCEDURE — 3700000002 HC GENERAL ANESTHESIA TIME - EACH INCREMENTAL 1 MINUTE

## 2024-05-15 RX ORDER — PROPOFOL 10 MG/ML
INJECTION, EMULSION INTRAVENOUS CONTINUOUS PRN
Status: DISCONTINUED | OUTPATIENT
Start: 2024-05-15 | End: 2024-05-15

## 2024-05-15 RX ORDER — FENTANYL CITRATE 50 UG/ML
INJECTION, SOLUTION INTRAMUSCULAR; INTRAVENOUS AS NEEDED
Status: DISCONTINUED | OUTPATIENT
Start: 2024-05-15 | End: 2024-05-15

## 2024-05-15 RX ORDER — SODIUM CHLORIDE, SODIUM LACTATE, POTASSIUM CHLORIDE, CALCIUM CHLORIDE 600; 310; 30; 20 MG/100ML; MG/100ML; MG/100ML; MG/100ML
20 INJECTION, SOLUTION INTRAVENOUS CONTINUOUS
Status: DISCONTINUED | OUTPATIENT
Start: 2024-05-15 | End: 2024-05-16 | Stop reason: HOSPADM

## 2024-05-15 RX ORDER — LIDOCAINE HYDROCHLORIDE 20 MG/ML
INJECTION, SOLUTION INFILTRATION; PERINEURAL AS NEEDED
Status: DISCONTINUED | OUTPATIENT
Start: 2024-05-15 | End: 2024-05-15

## 2024-05-15 RX ADMIN — FENTANYL CITRATE 25 MCG: 50 INJECTION, SOLUTION INTRAMUSCULAR; INTRAVENOUS at 10:02

## 2024-05-15 RX ADMIN — LIDOCAINE HYDROCHLORIDE 40 MG: 20 INJECTION, SOLUTION INFILTRATION; PERINEURAL at 10:00

## 2024-05-15 RX ADMIN — FENTANYL CITRATE 50 MCG: 50 INJECTION, SOLUTION INTRAMUSCULAR; INTRAVENOUS at 10:08

## 2024-05-15 RX ADMIN — PROPOFOL 400 MCG/KG/MIN: 10 INJECTION, EMULSION INTRAVENOUS at 10:00

## 2024-05-15 RX ADMIN — FENTANYL CITRATE 25 MCG: 50 INJECTION, SOLUTION INTRAMUSCULAR; INTRAVENOUS at 09:58

## 2024-05-15 ASSESSMENT — PAIN - FUNCTIONAL ASSESSMENT
PAIN_FUNCTIONAL_ASSESSMENT: 0-10

## 2024-05-15 ASSESSMENT — PAIN SCALES - GENERAL
PAINLEVEL_OUTOF10: 0 - NO PAIN

## 2024-05-15 ASSESSMENT — COLUMBIA-SUICIDE SEVERITY RATING SCALE - C-SSRS
1. IN THE PAST MONTH, HAVE YOU WISHED YOU WERE DEAD OR WISHED YOU COULD GO TO SLEEP AND NOT WAKE UP?: NO
6. HAVE YOU EVER DONE ANYTHING, STARTED TO DO ANYTHING, OR PREPARED TO DO ANYTHING TO END YOUR LIFE?: NO
2. HAVE YOU ACTUALLY HAD ANY THOUGHTS OF KILLING YOURSELF?: NO

## 2024-05-15 ASSESSMENT — ENCOUNTER SYMPTOMS: CONSTITUTIONAL NEGATIVE: 1

## 2024-05-15 NOTE — ANESTHESIA POSTPROCEDURE EVALUATION
Patient: Heide Russ    Procedure Summary       Date: 05/15/24 Room / Location: [facility]    Anesthesia Start: 0959 Anesthesia Stop: 1017    Procedure: ENDOSCOPIC ULTRASOUND (UPPER) Diagnosis: Subepithelial mass of stomach    Scheduled Providers: Barrett Burnett DO; Nathan Ennis MD; CARYN Lee-JAKE; Radha Rodriguez MA; Deondre Pierce RN Responsible Provider: Nathan Ennis MD    Anesthesia Type: MAC ASA Status: 2            Anesthesia Type: MAC    Vitals Value Taken Time   /81 05/15/24 1107   Temp 36.4 °C (97.5 °F) 05/15/24 1012   Pulse 61 05/15/24 1107   Resp 20 05/15/24 1107   SpO2 100 % 05/15/24 1107       Anesthesia Post Evaluation    Patient location during evaluation: PACU  Patient participation: complete - patient participated  Level of consciousness: awake and alert  Pain management: adequate  Airway patency: patent  Cardiovascular status: acceptable and hemodynamically stable  Respiratory status: acceptable, spontaneous ventilation and nonlabored ventilation  Hydration status: acceptable  Postoperative Nausea and Vomiting: none        There were no known notable events for this encounter.

## 2024-05-15 NOTE — ANESTHESIA PREPROCEDURE EVALUATION
Patient: Heide Russ    Procedure Information       Date/Time: 05/15/24 1000    Scheduled providers: Barrett Burnett DO; Nathan Ennis MD; JULIO CESAR Lee; Radha Rodriguez MA; Deondre Pierce RN    Procedure: ENDOSCOPIC ULTRASOUND (UPPER)    Location: [facility]            Relevant Problems   Cardiac   (+) Hyperlipidemia      Liver   (+) Calculus of gallbladder without cholecystitis without obstruction      HEENT   (+) Sinusitis      Skin   (+) Eczema of external auditory canal       Clinical information reviewed:   Tobacco  Allergies  Meds   Med Hx  Surg Hx  OB Status  Fam Hx  Soc   Hx         Past Medical History:   Diagnosis Date    Colon polyp January 2020    GERD (gastroesophageal reflux disease)     HTN (hypertension)     Presence of urogenital implants     Vaginal pessary present      Past Surgical History:   Procedure Laterality Date    BREAST SURGERY  2007    Breast reduction    COLONOSCOPY      HYSTERECTOMY  1990    PELVIC FLOOR REPAIR  05/10/2021    Anterior/posterior repair     Social History     Tobacco Use    Smoking status: Never     Passive exposure: Never    Smokeless tobacco: Never   Vaping Use    Vaping status: Never Used   Substance Use Topics    Alcohol use: Not Currently    Drug use: Never      Current Outpatient Medications   Medication Instructions    omeprazole (PRILOSEC) 40 mg, oral, Daily, Do not crush or chew.    triamterene-hydrochlorothiazid (Dyazide) 37.5-25 mg capsule 1 capsule, oral, Daily      No Known Allergies     Chemistry    Lab Results   Component Value Date/Time     11/30/2023 0905    K 4.2 11/30/2023 0905     11/30/2023 0905    CO2 29 11/30/2023 0905    BUN 18 11/30/2023 0905    CREATININE 1.03 11/30/2023 0905    Lab Results   Component Value Date/Time    CALCIUM 9.7 11/30/2023 0905    ALKPHOS 102 11/30/2023 0905    AST 15 11/30/2023 0905    ALT 17 11/30/2023 0905    BILITOT 0.6 11/30/2023 0905          No results found for:  "\"HGBA1C\"  Lab Results   Component Value Date/Time    WBC 6.2 11/30/2023 0905    HGB 12.6 11/30/2023 0905    HCT 38.9 11/30/2023 0905     11/30/2023 0905     No results found for: \"PROTIME\", \"PTT\", \"INR\"  No results found for: \"ABORH\"  No results found for this or any previous visit (from the past 4464 hour(s)).  No results found for this or any previous visit from the past 1095 days.       Visit Vitals  BP (!) 162/98   Pulse 79   Temp 36.6 °C (97.9 °F) (Temporal)   Resp 16   Ht 1.651 m (5' 5\")   Wt 86.3 kg (190 lb 4.1 oz)   SpO2 99%   BMI 31.66 kg/m²   OB Status Postmenopausal   Smoking Status Never   BSA 1.99 m²     NPO/Void Status  Carbohydrate Drink Given Prior to Surgery? : N  Date of Last Liquid: 05/14/24  Time of Last Liquid: 2100  Date of Last Solid: 05/14/24  Time of Last Solid: 2100  Last Intake Type: Clear fluids  Time of Last Void: 0900         Physical Exam    Airway  Mallampati: II  TM distance: >3 FB  Neck ROM: full     Cardiovascular   Rhythm: regular  Rate: normal     Dental - normal exam  (+) upper dentures     Pulmonary   Breath sounds clear to auscultation     Abdominal - normal exam       Other findings: 5/10/2021: mac 3, grade 1           Anesthesia Plan    History of general anesthesia?: yes  History of complications of general anesthesia?: no    ASA 2     MAC   (With standard ASA monitoring.)  intravenous induction   Anesthetic plan and risks discussed with patient.    Plan discussed with CRNA and CAA.        "

## 2024-05-15 NOTE — H&P
"History Of Present Illness  Heide Russ is a 69 y.o. female presenting with a recent endoscopy that noted an incidental subepithelial lesion in the stomach along the greater curve.     Past Medical History  Past Medical History:   Diagnosis Date    Colon polyp January 2020    GERD (gastroesophageal reflux disease)     HTN (hypertension)     Presence of urogenital implants     Vaginal pessary present     Surgical History  Past Surgical History:   Procedure Laterality Date    BREAST SURGERY  2007    Breast reduction    COLONOSCOPY      HYSTERECTOMY  1990    PELVIC FLOOR REPAIR  05/10/2021    Anterior/posterior repair     Social History  She reports that she has never smoked. She has never been exposed to tobacco smoke. She has never used smokeless tobacco. She reports that she does not currently use alcohol. She reports that she does not use drugs.    Family History  Family History   Problem Relation Name Age of Onset    Stroke Mother Virginia     Colon cancer Mother Virginia     Lung cancer Sister      Lung cancer Brother          Allergies  No Known Allergies  Review of Systems   Constitutional: Negative.         Physical Exam  Constitutional:       Appearance: Normal appearance.   Cardiovascular:      Rate and Rhythm: Normal rate and regular rhythm.   Pulmonary:      Effort: Pulmonary effort is normal.      Breath sounds: Normal breath sounds.   Abdominal:      Palpations: Abdomen is soft.   Neurological:      Mental Status: She is alert.   Psychiatric:         Mood and Affect: Mood normal.         Behavior: Behavior normal.         Judgment: Judgment normal.          Last Recorded Vitals  Blood pressure (!) 162/98, pulse 79, temperature 36.6 °C (97.9 °F), temperature source Temporal, resp. rate 16, height 1.651 m (5' 5\"), weight 86.3 kg (190 lb 4.1 oz), SpO2 99%.    Assessment/Plan   EUS as planned     Barrett Burnett, DO  "

## 2024-05-15 NOTE — DISCHARGE INSTRUCTIONS

## 2024-05-22 ENCOUNTER — TELEPHONE (OUTPATIENT)
Dept: GASTROENTEROLOGY | Facility: CLINIC | Age: 69
End: 2024-05-22
Payer: MEDICARE

## 2024-05-22 NOTE — TELEPHONE ENCOUNTER
Phone - doing well on omeprazole 40 mg every other day - will reduce to every third day for two weeks, then discontinue - discussed significance of GB sludge

## 2024-05-22 NOTE — TELEPHONE ENCOUNTER
----- Message from Catherine Alaniz MA sent at 5/21/2024 11:19 AM EDT -----  She would like to discuss a few things with you on Wednesday please call

## 2024-08-21 ENCOUNTER — OFFICE VISIT (OUTPATIENT)
Dept: PRIMARY CARE | Facility: CLINIC | Age: 69
End: 2024-08-21
Payer: MEDICARE

## 2024-08-21 VITALS
DIASTOLIC BLOOD PRESSURE: 80 MMHG | HEIGHT: 65 IN | SYSTOLIC BLOOD PRESSURE: 124 MMHG | TEMPERATURE: 97.8 F | HEART RATE: 80 BPM | WEIGHT: 196.6 LBS | OXYGEN SATURATION: 98 % | BODY MASS INDEX: 32.76 KG/M2 | RESPIRATION RATE: 16 BRPM

## 2024-08-21 DIAGNOSIS — Z78.0 POST-MENOPAUSAL: Primary | ICD-10-CM

## 2024-08-21 DIAGNOSIS — K83.8 BILIARY SLUDGE: ICD-10-CM

## 2024-08-21 DIAGNOSIS — Z12.31 ENCOUNTER FOR SCREENING MAMMOGRAM FOR BREAST CANCER: ICD-10-CM

## 2024-08-21 DIAGNOSIS — K21.9 GASTROESOPHAGEAL REFLUX DISEASE WITHOUT ESOPHAGITIS: ICD-10-CM

## 2024-08-21 DIAGNOSIS — J30.2 SEASONAL ALLERGIC RHINITIS, UNSPECIFIED TRIGGER: ICD-10-CM

## 2024-08-21 PROCEDURE — 99214 OFFICE O/P EST MOD 30 MIN: CPT | Performed by: FAMILY MEDICINE

## 2024-08-21 PROCEDURE — 1123F ACP DISCUSS/DSCN MKR DOCD: CPT | Performed by: FAMILY MEDICINE

## 2024-08-21 PROCEDURE — 1158F ADVNC CARE PLAN TLK DOCD: CPT | Performed by: FAMILY MEDICINE

## 2024-08-21 PROCEDURE — 3008F BODY MASS INDEX DOCD: CPT | Performed by: FAMILY MEDICINE

## 2024-08-21 PROCEDURE — 1036F TOBACCO NON-USER: CPT | Performed by: FAMILY MEDICINE

## 2024-08-21 PROCEDURE — 1159F MED LIST DOCD IN RCRD: CPT | Performed by: FAMILY MEDICINE

## 2024-08-21 ASSESSMENT — ANXIETY QUESTIONNAIRES
4. TROUBLE RELAXING: NOT AT ALL
3. WORRYING TOO MUCH ABOUT DIFFERENT THINGS: NOT AT ALL
2. NOT BEING ABLE TO STOP OR CONTROL WORRYING: NOT AT ALL
5. BEING SO RESTLESS THAT IT IS HARD TO SIT STILL: NOT AT ALL
GAD7 TOTAL SCORE: 0
1. FEELING NERVOUS, ANXIOUS, OR ON EDGE: NOT AT ALL
IF YOU CHECKED OFF ANY PROBLEMS ON THIS QUESTIONNAIRE, HOW DIFFICULT HAVE THESE PROBLEMS MADE IT FOR YOU TO DO YOUR WORK, TAKE CARE OF THINGS AT HOME, OR GET ALONG WITH OTHER PEOPLE: NOT DIFFICULT AT ALL
6. BECOMING EASILY ANNOYED OR IRRITABLE: NOT AT ALL
7. FEELING AFRAID AS IF SOMETHING AWFUL MIGHT HAPPEN: NOT AT ALL

## 2024-08-21 ASSESSMENT — PATIENT HEALTH QUESTIONNAIRE - PHQ9
SUM OF ALL RESPONSES TO PHQ9 QUESTIONS 1 AND 2: 0
1. LITTLE INTEREST OR PLEASURE IN DOING THINGS: NOT AT ALL
2. FEELING DOWN, DEPRESSED OR HOPELESS: NOT AT ALL

## 2024-08-21 NOTE — PROGRESS NOTES
"Subjective   Heide Russ is a 69 y.o. female who presents for New Patient Visit and mammogram order.  HPI  PMH:  Bladder prolapse Sx 2021 Dr Hernandez   MAE 1990  HTN   EGD 4/2024 stomach mass then EUS, showed stomach lipoma, mild biliary sludge 5/2024 Dr Shankar   Colon polyps 4/2024 rpt 3 yr   PNA/RSV UTD     Here to get est    GERD last yr. Was not watching her diet as closely.   Was getting hives w omeprazole daily now taking     BP was high w sinus inf at [facility]. Does not check BP at home.     Cerumen removal Saw Dr Neal ENT. Also w hoarsenss since July w URI rec using 2 nasal sprays (flonase, astelin) and allergra. Shortly after these meds hoarseness worse   Current Outpatient Medications on File Prior to Visit   Medication Sig Dispense Refill    omeprazole (PriLOSEC) 40 mg DR capsule Take 1 capsule (40 mg) by mouth once daily. Do not crush or chew. (Patient taking differently: Take 1 capsule (40 mg) by mouth every other day. Do not crush or chew.) 30 capsule 11    triamterene-hydrochlorothiazid (Dyazide) 37.5-25 mg capsule TAKE 1 CAPSULE BY MOUTH EVERY DAY 90 capsule 2     No current facility-administered medications on file prior to visit.                  Objective   /80 (BP Location: Right arm)   Pulse 80   Temp 36.6 °C (97.8 °F)   Resp 16   Ht 1.651 m (5' 5\")   Wt 89.2 kg (196 lb 9.6 oz)   SpO2 98%   BMI 32.72 kg/m²    Physical Exam  General: NAD  HEENT:NCAT, PERRLA, nml OP, b/l TM clear, NT cobbelstoning   Neck: no cervical MARY KATE  Heart: RRR no murmur, no edema   Lungs: CTA b/l, no wheeze or rhonchi   GI: abd soft, nontender, nondistended.   MSK: no c/c/e. nml gait   Skin: warm and dry  Psych: cooperative, appropriate affect  Neuro: speech clear. A&Ox3  Assessment/Plan   Problem List Items Addressed This Visit       Seasonal allergic rhinitis  Hoarseness worse since starting flonase/allegra/astelin  Rec to hold flonase as this can cause inflammation/candidia  If no improvement f/up  " Abbass        Other Visit Diagnoses       Post-menopausal    -  Primary  DEXA 1.4 1/2022, osteopenia   Rpt dexa    Relevant Orders    XR DEXA bone density    Encounter for screening mammogram for breast cancer    Due in dec, ordered         Relevant Orders    BI mammo bilateral screening tomosynthesis    Gastroesophageal reflux disease without esophagitis     Unclear if GERD is contributing to hoarseness   Cont omeprazole 40 every other day for now        Biliary sludge      Noted on EUS  Asym.   Watching her diet     Rec shingrix #2, flu/covid fall

## 2024-09-06 DIAGNOSIS — R10.13 EPIGASTRIC PAIN: ICD-10-CM

## 2024-09-06 RX ORDER — OMEPRAZOLE 40 MG/1
40 CAPSULE, DELAYED RELEASE ORAL DAILY
Qty: 90 CAPSULE | Refills: 0 | Status: SHIPPED | OUTPATIENT
Start: 2024-09-06

## 2024-10-08 ENCOUNTER — HOSPITAL ENCOUNTER (OUTPATIENT)
Dept: RADIOLOGY | Facility: CLINIC | Age: 69
Discharge: HOME | End: 2024-10-08
Payer: MEDICARE

## 2024-10-08 DIAGNOSIS — Z78.0 POST-MENOPAUSAL: ICD-10-CM

## 2024-10-08 PROCEDURE — 77080 DXA BONE DENSITY AXIAL: CPT | Performed by: RADIOLOGY

## 2024-10-08 PROCEDURE — 77080 DXA BONE DENSITY AXIAL: CPT

## 2024-10-11 ENCOUNTER — APPOINTMENT (OUTPATIENT)
Dept: PRIMARY CARE | Facility: CLINIC | Age: 69
End: 2024-10-11
Payer: MEDICARE

## 2024-10-21 DIAGNOSIS — I10 HYPERTENSION, UNSPECIFIED TYPE: ICD-10-CM

## 2024-10-21 RX ORDER — TRIAMTERENE AND HYDROCHLOROTHIAZIDE 37.5; 25 MG/1; MG/1
1 CAPSULE ORAL DAILY
Qty: 90 CAPSULE | Refills: 3 | Status: SHIPPED | OUTPATIENT
Start: 2024-10-21

## 2024-12-09 ENCOUNTER — APPOINTMENT (OUTPATIENT)
Dept: PRIMARY CARE | Facility: CLINIC | Age: 69
End: 2024-12-09
Payer: MEDICARE

## 2024-12-23 ENCOUNTER — APPOINTMENT (OUTPATIENT)
Dept: PRIMARY CARE | Facility: CLINIC | Age: 69
End: 2024-12-23
Payer: MEDICARE

## 2024-12-23 ENCOUNTER — HOSPITAL ENCOUNTER (OUTPATIENT)
Dept: RADIOLOGY | Facility: CLINIC | Age: 69
Discharge: HOME | End: 2024-12-23
Payer: MEDICARE

## 2024-12-23 VITALS — HEIGHT: 65 IN | BODY MASS INDEX: 32.32 KG/M2 | WEIGHT: 194 LBS

## 2024-12-23 DIAGNOSIS — Z12.31 ENCOUNTER FOR SCREENING MAMMOGRAM FOR BREAST CANCER: ICD-10-CM

## 2024-12-23 PROCEDURE — 77067 SCR MAMMO BI INCL CAD: CPT

## 2024-12-23 PROCEDURE — 77067 SCR MAMMO BI INCL CAD: CPT | Performed by: STUDENT IN AN ORGANIZED HEALTH CARE EDUCATION/TRAINING PROGRAM

## 2024-12-23 PROCEDURE — 77063 BREAST TOMOSYNTHESIS BI: CPT | Performed by: STUDENT IN AN ORGANIZED HEALTH CARE EDUCATION/TRAINING PROGRAM

## 2025-01-03 ENCOUNTER — TELEPHONE (OUTPATIENT)
Dept: PRIMARY CARE | Facility: CLINIC | Age: 70
End: 2025-01-03
Payer: MEDICARE

## 2025-01-03 DIAGNOSIS — R68.89 ABNORMAL ENDOCRINE LABORATORY TEST FINDING: ICD-10-CM

## 2025-01-03 DIAGNOSIS — R73.9 HYPERGLYCEMIA: ICD-10-CM

## 2025-01-03 DIAGNOSIS — E55.9 VITAMIN D DEFICIENCY: Primary | ICD-10-CM

## 2025-01-03 DIAGNOSIS — E78.5 HYPERLIPIDEMIA, UNSPECIFIED HYPERLIPIDEMIA TYPE: ICD-10-CM

## 2025-01-03 NOTE — TELEPHONE ENCOUNTER
Pt would like Vitamin D added to [facility] lab orders.    CPE/AWV/Notes indicate  LABS?    [This message was generated from the Lab Report.]

## 2025-01-14 ENCOUNTER — LAB (OUTPATIENT)
Dept: LAB | Facility: LAB | Age: 70
End: 2025-01-14
Payer: MEDICARE

## 2025-01-14 DIAGNOSIS — R68.89 ABNORMAL ENDOCRINE LABORATORY TEST FINDING: ICD-10-CM

## 2025-01-14 DIAGNOSIS — R73.9 HYPERGLYCEMIA: ICD-10-CM

## 2025-01-14 DIAGNOSIS — E55.9 VITAMIN D DEFICIENCY: ICD-10-CM

## 2025-01-14 DIAGNOSIS — E78.5 HYPERLIPIDEMIA, UNSPECIFIED HYPERLIPIDEMIA TYPE: ICD-10-CM

## 2025-01-14 LAB
25(OH)D3 SERPL-MCNC: 25 NG/ML (ref 30–100)
ALBUMIN SERPL BCP-MCNC: 4.5 G/DL (ref 3.4–5)
ALP SERPL-CCNC: 112 U/L (ref 33–136)
ALT SERPL W P-5'-P-CCNC: 16 U/L (ref 7–45)
ANION GAP SERPL CALC-SCNC: 15 MMOL/L (ref 10–20)
AST SERPL W P-5'-P-CCNC: 15 U/L (ref 9–39)
BASOPHILS # BLD AUTO: 0.03 X10*3/UL (ref 0–0.1)
BASOPHILS NFR BLD AUTO: 0.5 %
BILIRUB SERPL-MCNC: 0.8 MG/DL (ref 0–1.2)
BUN SERPL-MCNC: 18 MG/DL (ref 6–23)
CALCIUM SERPL-MCNC: 9.7 MG/DL (ref 8.6–10.6)
CHLORIDE SERPL-SCNC: 103 MMOL/L (ref 98–107)
CHOLEST SERPL-MCNC: 218 MG/DL (ref 0–199)
CHOLESTEROL/HDL RATIO: 3.8
CO2 SERPL-SCNC: 27 MMOL/L (ref 21–32)
CREAT SERPL-MCNC: 0.91 MG/DL (ref 0.5–1.05)
EGFRCR SERPLBLD CKD-EPI 2021: 68 ML/MIN/1.73M*2
EOSINOPHIL # BLD AUTO: 0.17 X10*3/UL (ref 0–0.7)
EOSINOPHIL NFR BLD AUTO: 2.8 %
ERYTHROCYTE [DISTWIDTH] IN BLOOD BY AUTOMATED COUNT: 12.6 % (ref 11.5–14.5)
EST. AVERAGE GLUCOSE BLD GHB EST-MCNC: 131 MG/DL
GLUCOSE SERPL-MCNC: 99 MG/DL (ref 74–99)
HBA1C MFR BLD: 6.2 %
HCT VFR BLD AUTO: 39 % (ref 36–46)
HDLC SERPL-MCNC: 57.1 MG/DL
HGB BLD-MCNC: 12.7 G/DL (ref 12–16)
IMM GRANULOCYTES # BLD AUTO: 0.01 X10*3/UL (ref 0–0.7)
IMM GRANULOCYTES NFR BLD AUTO: 0.2 % (ref 0–0.9)
LDLC SERPL CALC-MCNC: 140 MG/DL
LYMPHOCYTES # BLD AUTO: 3.04 X10*3/UL (ref 1.2–4.8)
LYMPHOCYTES NFR BLD AUTO: 50.2 %
MCH RBC QN AUTO: 30.3 PG (ref 26–34)
MCHC RBC AUTO-ENTMCNC: 32.6 G/DL (ref 32–36)
MCV RBC AUTO: 93 FL (ref 80–100)
MONOCYTES # BLD AUTO: 0.5 X10*3/UL (ref 0.1–1)
MONOCYTES NFR BLD AUTO: 8.3 %
NEUTROPHILS # BLD AUTO: 2.31 X10*3/UL (ref 1.2–7.7)
NEUTROPHILS NFR BLD AUTO: 38 %
NON HDL CHOLESTEROL: 161 MG/DL (ref 0–149)
NRBC BLD-RTO: 0 /100 WBCS (ref 0–0)
PLATELET # BLD AUTO: 238 X10*3/UL (ref 150–450)
POTASSIUM SERPL-SCNC: 3.9 MMOL/L (ref 3.5–5.3)
PROT SERPL-MCNC: 7.6 G/DL (ref 6.4–8.2)
RBC # BLD AUTO: 4.19 X10*6/UL (ref 4–5.2)
SODIUM SERPL-SCNC: 141 MMOL/L (ref 136–145)
TRIGL SERPL-MCNC: 106 MG/DL (ref 0–149)
TSH SERPL-ACNC: 1.27 MIU/L (ref 0.44–3.98)
VLDL: 21 MG/DL (ref 0–40)
WBC # BLD AUTO: 6.1 X10*3/UL (ref 4.4–11.3)

## 2025-01-14 PROCEDURE — 80053 COMPREHEN METABOLIC PANEL: CPT

## 2025-01-14 PROCEDURE — 80061 LIPID PANEL: CPT

## 2025-01-14 PROCEDURE — 84443 ASSAY THYROID STIM HORMONE: CPT

## 2025-01-14 PROCEDURE — 82306 VITAMIN D 25 HYDROXY: CPT

## 2025-01-14 PROCEDURE — 83036 HEMOGLOBIN GLYCOSYLATED A1C: CPT

## 2025-01-14 PROCEDURE — 85025 COMPLETE CBC W/AUTO DIFF WBC: CPT

## 2025-01-15 ENCOUNTER — TELEPHONE (OUTPATIENT)
Dept: OBSTETRICS AND GYNECOLOGY | Facility: CLINIC | Age: 70
End: 2025-01-15

## 2025-01-15 ENCOUNTER — APPOINTMENT (OUTPATIENT)
Dept: PRIMARY CARE | Facility: CLINIC | Age: 70
End: 2025-01-15
Payer: MEDICARE

## 2025-01-15 VITALS
TEMPERATURE: 97.7 F | DIASTOLIC BLOOD PRESSURE: 88 MMHG | HEART RATE: 73 BPM | BODY MASS INDEX: 32.82 KG/M2 | WEIGHT: 197 LBS | SYSTOLIC BLOOD PRESSURE: 136 MMHG | HEIGHT: 65 IN | RESPIRATION RATE: 15 BRPM | OXYGEN SATURATION: 97 %

## 2025-01-15 DIAGNOSIS — E55.9 VITAMIN D DEFICIENCY: ICD-10-CM

## 2025-01-15 DIAGNOSIS — K21.9 GASTROESOPHAGEAL REFLUX DISEASE WITHOUT ESOPHAGITIS: Primary | ICD-10-CM

## 2025-01-15 DIAGNOSIS — R10.13 EPIGASTRIC PAIN: ICD-10-CM

## 2025-01-15 DIAGNOSIS — M85.80 OSTEOPENIA, UNSPECIFIED LOCATION: ICD-10-CM

## 2025-01-15 DIAGNOSIS — R82.90 ABNORMAL URINE ODOR: ICD-10-CM

## 2025-01-15 DIAGNOSIS — E78.5 HYPERLIPIDEMIA, UNSPECIFIED HYPERLIPIDEMIA TYPE: ICD-10-CM

## 2025-01-15 DIAGNOSIS — R73.03 PREDIABETES: ICD-10-CM

## 2025-01-15 DIAGNOSIS — Z00.00 ENCOUNTER FOR ANNUAL WELLNESS EXAM IN MEDICARE PATIENT: ICD-10-CM

## 2025-01-15 LAB
POC APPEARANCE, URINE: ABNORMAL
POC BILIRUBIN, URINE: NEGATIVE
POC BLOOD, URINE: ABNORMAL
POC COLOR, URINE: YELLOW
POC GLUCOSE, URINE: NEGATIVE MG/DL
POC KETONES, URINE: NEGATIVE MG/DL
POC LEUKOCYTES, URINE: ABNORMAL
POC NITRITE,URINE: NEGATIVE
POC PH, URINE: 8.5 PH
POC PROTEIN, URINE: NEGATIVE MG/DL
POC SPECIFIC GRAVITY, URINE: 1.02
POC UROBILINOGEN, URINE: 1 EU/DL

## 2025-01-15 PROCEDURE — 3008F BODY MASS INDEX DOCD: CPT | Performed by: FAMILY MEDICINE

## 2025-01-15 PROCEDURE — 1158F ADVNC CARE PLAN TLK DOCD: CPT | Performed by: FAMILY MEDICINE

## 2025-01-15 PROCEDURE — 99213 OFFICE O/P EST LOW 20 MIN: CPT | Performed by: FAMILY MEDICINE

## 2025-01-15 PROCEDURE — 1159F MED LIST DOCD IN RCRD: CPT | Performed by: FAMILY MEDICINE

## 2025-01-15 PROCEDURE — 1170F FXNL STATUS ASSESSED: CPT | Performed by: FAMILY MEDICINE

## 2025-01-15 PROCEDURE — 87086 URINE CULTURE/COLONY COUNT: CPT

## 2025-01-15 PROCEDURE — 81003 URINALYSIS AUTO W/O SCOPE: CPT | Performed by: FAMILY MEDICINE

## 2025-01-15 PROCEDURE — 81001 URINALYSIS AUTO W/SCOPE: CPT

## 2025-01-15 PROCEDURE — G0439 PPPS, SUBSEQ VISIT: HCPCS | Performed by: FAMILY MEDICINE

## 2025-01-15 PROCEDURE — 1123F ACP DISCUSS/DSCN MKR DOCD: CPT | Performed by: FAMILY MEDICINE

## 2025-01-15 PROCEDURE — 1036F TOBACCO NON-USER: CPT | Performed by: FAMILY MEDICINE

## 2025-01-15 PROCEDURE — 87077 CULTURE AEROBIC IDENTIFY: CPT

## 2025-01-15 RX ORDER — OMEPRAZOLE 40 MG/1
40 CAPSULE, DELAYED RELEASE ORAL DAILY
Qty: 90 CAPSULE | Refills: 3 | Status: CANCELLED | OUTPATIENT
Start: 2025-01-15

## 2025-01-15 RX ORDER — OMEPRAZOLE 20 MG/1
20 CAPSULE, DELAYED RELEASE ORAL DAILY
Qty: 30 CAPSULE | Refills: 11 | Status: SHIPPED | OUTPATIENT
Start: 2025-01-15 | End: 2026-01-15

## 2025-01-15 ASSESSMENT — ENCOUNTER SYMPTOMS
DEPRESSION: 0
LOSS OF SENSATION IN FEET: 0
OCCASIONAL FEELINGS OF UNSTEADINESS: 0

## 2025-01-15 ASSESSMENT — ACTIVITIES OF DAILY LIVING (ADL)
MANAGING_FINANCES: INDEPENDENT
BATHING: INDEPENDENT
DRESSING: INDEPENDENT
DOING_HOUSEWORK: INDEPENDENT
TAKING_MEDICATION: INDEPENDENT
GROCERY_SHOPPING: INDEPENDENT

## 2025-01-15 ASSESSMENT — PATIENT HEALTH QUESTIONNAIRE - PHQ9
2. FEELING DOWN, DEPRESSED OR HOPELESS: NOT AT ALL
SUM OF ALL RESPONSES TO PHQ9 QUESTIONS 1 AND 2: 0
SUM OF ALL RESPONSES TO PHQ9 QUESTIONS 1 AND 2: 0
1. LITTLE INTEREST OR PLEASURE IN DOING THINGS: NOT AT ALL
2. FEELING DOWN, DEPRESSED OR HOPELESS: NOT AT ALL
1. LITTLE INTEREST OR PLEASURE IN DOING THINGS: NOT AT ALL

## 2025-01-15 NOTE — PROGRESS NOTES
"Subjective   Heide Russ is a 69 y.o. female who presents for Medicare Annual Wellness Visit Subsequent.  HPI  PMH:  Bladder prolapse Sx 2021 Dr Hernandez   MAE 1990  HTN   EGD 4/2024 stomach mass then EUS, showed stomach lipoma, mild biliary sludge 5/2024 Dr Shankar   Colon polyps 4/2024 rpt 3 yr   Osteopenia DEXA -1.4 1/2022, -1.7 10/2024  PNA/RSV/shingrix UTD     Here for AWV     Restarted omeprazole in nov at 40 mg. Helped Symptoms but worried about SE.     Hoarseness resolved.     Would like to est w GYN, previous Dr Aj pt.     Occ brown urine over summer. Inc water and not doing hair color treatments. Abnml urine ordor for mo.     Eating more candies but tries to watch diet and plans to ex more. Does better in summer    Off vit D   Current Outpatient Medications on File Prior to Visit   Medication Sig Dispense Refill    triamterene-hydrochlorothiazid (Dyazide) 37.5-25 mg capsule Take 1 capsule by mouth once daily. 90 capsule 3    [DISCONTINUED] omeprazole (PriLOSEC) 40 mg DR capsule Take 1 capsule (40 mg) by mouth once daily. MUST ESTABLISH WITH NEW GI FOR FURTHER REFILLS 90 capsule 0     No current facility-administered medications on file prior to visit.                  Objective   /88   Pulse 73   Temp 36.5 °C (97.7 °F)   Resp 15   Ht 1.651 m (5' 5\")   Wt 89.4 kg (197 lb)   SpO2 97%   BMI 32.78 kg/m²    Physical Exam  General: NAD  HEENT:NCAT, PERRLA, nml OP, b/l TM clear   Neck: no cervical MARY KATE  Heart: RRR no murmur, no edema   Lungs: CTA b/l, no wheeze or rhonchi   GI: abd soft, nontender, nondistended.   MSK: no c/c/e. nml gait   Skin: warm and dry  Psych: cooperative, appropriate affect  Neuro: speech clear. A&Ox3  3/3 5 min object recall     Assessment/Plan   Problem List Items Addressed This Visit       Vitamin D deficiency  Start vit D3 1000 OTC  Rpt 3 mo    Relevant Orders    Vitamin D 25 hydroxy    Hyperlipidemia  CAC 2018 4  Rpt CAC   Lipids increased, will work no lifestyle " changes and rpt in 3 mo  Hold off on statin for now   Ascvd 14%      Relevant Orders    CT cardiac scoring wo IV contrast    Lipid Panel     Other Visit Diagnoses       Gastroesophageal reflux disease without esophagitis      Improved w omeprazole 40 so will do trial of lower 20 mg to see if Sx controlled       Relevant Medications    omeprazole (PriLOSEC) 20 mg DR capsule        Osteopenia, unspecified location      DEXA UTD   Cont wt bearing ex  Adding vit D       Encounter for annual wellness exam in Medicare patient      overall doing well. Discussed diet/ex changes  BMI: 32  VACC: reviewed UTD  COLON CA SCRN: UTD  LABS: reviewed w pt at goal besdies aforementioned  PAP: n/a but reached out to Dr Lino to est for preventative care   MAMMO: UTD  DEXA: UTD      Prediabetes      Discussed diet/ex changes  Rpt 3 mo      Relevant Orders    Hemoglobin A1C    Abnormal urine odor      Abnml UA   Check Cx/micro prior to abx     Relevant Orders    POCT UA Automated manually resulted (Completed)    Urine Culture    Microscopic Only, Urine

## 2025-01-16 LAB
BACTERIA #/AREA URNS AUTO: ABNORMAL /HPF
BACTERIA UR CULT: ABNORMAL
MUCOUS THREADS #/AREA URNS AUTO: ABNORMAL /LPF
RBC #/AREA URNS AUTO: ABNORMAL /HPF
SQUAMOUS #/AREA URNS AUTO: ABNORMAL /HPF
TRANS CELLS #/AREA UR COMP ASSIST: ABNORMAL /HPF
WBC #/AREA URNS AUTO: ABNORMAL /HPF

## 2025-01-17 ENCOUNTER — TELEPHONE (OUTPATIENT)
Dept: PRIMARY CARE | Facility: CLINIC | Age: 70
End: 2025-01-17
Payer: MEDICARE

## 2025-01-17 DIAGNOSIS — N39.0 URINARY TRACT INFECTION WITHOUT HEMATURIA, SITE UNSPECIFIED: Primary | ICD-10-CM

## 2025-01-17 RX ORDER — AMOXICILLIN 500 MG/1
500 CAPSULE ORAL EVERY 8 HOURS SCHEDULED
Qty: 15 CAPSULE | Refills: 0 | Status: SHIPPED | OUTPATIENT
Start: 2025-01-17 | End: 2025-01-22

## 2025-01-17 NOTE — TELEPHONE ENCOUNTER
Patient called back in, stated she missed a call but did get v/m with details, apt made for 2 wks out, AM

## 2025-01-17 NOTE — TELEPHONE ENCOUNTER
Urine Cx grew a common strain of bacteria called group B strep.   I sent in amoxil TID x 5 d     There was microscopic blood in the urine. This is commonly d/t infection but its important to make sure that it resolves.   Pls kamille f/up w me in 2-4 wk to recheck urine

## 2025-01-25 ENCOUNTER — OFFICE VISIT (OUTPATIENT)
Dept: URGENT CARE | Age: 70
End: 2025-01-25
Payer: MEDICARE

## 2025-01-25 VITALS
OXYGEN SATURATION: 99 % | TEMPERATURE: 97.6 F | HEART RATE: 86 BPM | SYSTOLIC BLOOD PRESSURE: 164 MMHG | DIASTOLIC BLOOD PRESSURE: 89 MMHG | RESPIRATION RATE: 20 BRPM

## 2025-01-25 DIAGNOSIS — H65.01 RIGHT ACUTE SEROUS OTITIS MEDIA, RECURRENCE NOT SPECIFIED: Primary | ICD-10-CM

## 2025-01-25 DIAGNOSIS — R09.81 CONGESTION OF NASAL SINUS: ICD-10-CM

## 2025-01-25 LAB
POC RAPID INFLUENZA A: NEGATIVE
POC RAPID INFLUENZA B: NEGATIVE
POC SARS-COV-2 AG BINAX: NORMAL

## 2025-01-25 RX ORDER — CEFDINIR 300 MG/1
300 CAPSULE ORAL 2 TIMES DAILY
Qty: 20 CAPSULE | Refills: 0 | Status: SHIPPED | OUTPATIENT
Start: 2025-01-25 | End: 2025-02-04

## 2025-01-25 RX ORDER — FLUTICASONE PROPIONATE 50 MCG
1 SPRAY, SUSPENSION (ML) NASAL DAILY
Qty: 16 G | Refills: 0 | Status: SHIPPED | OUTPATIENT
Start: 2025-01-25 | End: 2025-02-04

## 2025-01-25 ASSESSMENT — ENCOUNTER SYMPTOMS
SINUS PRESSURE: 0
COUGH: 0
SINUS PAIN: 0
FEVER: 0
SORE THROAT: 1
HEADACHES: 0
CHILLS: 0

## 2025-01-25 NOTE — PROGRESS NOTES
Subjective   Patient ID: Heide Russ is a 69 y.o. female. They present today with a chief complaint of Earache and Nasal Congestion (X 4 days).    History of Present Illness    Earache   Associated symptoms include a sore throat. Pertinent negatives include no coughing or headaches.       Patient presents to urgent care for complaints of earache, nasal congestion, and sore throat for 4 days.  She has been using Tylenol cold and sinus.  Reports that she has recently around her grandkids who are sick.    Past Medical History  Allergies as of 01/25/2025    (No Known Allergies)       (Not in a hospital admission)       Past Medical History:   Diagnosis Date    Colon polyp January 2020    GERD (gastroesophageal reflux disease)     HTN (hypertension)     Presence of urogenital implants     Vaginal pessary present       Past Surgical History:   Procedure Laterality Date    BREAST SURGERY  2007    Breast reduction    COLONOSCOPY      HYSTERECTOMY  1990    PELVIC FLOOR REPAIR  05/10/2021    Anterior/posterior repair        reports that she has never smoked. She has never been exposed to tobacco smoke. She has never used smokeless tobacco. She reports that she does not currently use alcohol. She reports that she does not use drugs.    Review of Systems  Review of Systems   Constitutional:  Negative for chills and fever.   HENT:  Positive for congestion, ear pain, postnasal drip and sore throat. Negative for sinus pressure and sinus pain.    Respiratory:  Negative for cough.    Neurological:  Negative for headaches.                                  Objective    Vitals:    01/25/25 0908   BP: 164/89   Pulse: 86   Resp: 20   Temp: 36.4 °C (97.6 °F)   TempSrc: Temporal   SpO2: 99%     No LMP recorded. Patient is postmenopausal.    Physical Exam  Vitals reviewed.   Constitutional:       Appearance: Normal appearance.   HENT:      Head: Normocephalic.      Right Ear: Ear canal and external ear normal. A middle ear effusion is  present. Tympanic membrane is erythematous.      Left Ear: Ear canal and external ear normal. A middle ear effusion is present.      Nose:      Right Sinus: Frontal sinus tenderness present.      Left Sinus: Frontal sinus tenderness present.      Mouth/Throat:      Pharynx: Posterior oropharyngeal erythema and postnasal drip present.      Tonsils: No tonsillar exudate.   Cardiovascular:      Rate and Rhythm: Normal rate and regular rhythm.      Heart sounds: Normal heart sounds.   Pulmonary:      Effort: Pulmonary effort is normal.      Breath sounds: Normal breath sounds and air entry.   Skin:     General: Skin is warm and dry.   Neurological:      Mental Status: She is alert.         Procedures    Point of Care Test & Imaging Results from this visit  Results for orders placed or performed in visit on 01/25/25   POCT Influenza A/B manually resulted   Result Value Ref Range    POC Rapid Influenza A Negative Negative    POC Rapid Influenza B Negative Negative   POCT Covid-19 Rapid Antigen   Result Value Ref Range    POC NATHAN-COV-2 AG  Presumptive negative test for SARS-CoV-2 (no antigen detected)     Presumptive negative test for SARS-CoV-2 (no antigen detected)      No results found.    Diagnostic study results (if any) were reviewed by [facility].    Assessment/Plan   Allergies, medications, history, and pertinent labs/EKGs/Imaging reviewed by NORRIS Molina.     Medical Decision Making  Rapid COVID and flu were both negative.  Will start patient on cefdinir for acute serous otitis media.  Will also start patient on Flonase.  Patient was told to take Coricidin cold medication to help with her symptoms.  Make sure you are staying hydrated with fluids.  If your symptoms are not improving or worsening in the next 5 to 7 days follow-up with PCP or go to urgent care.    At time of discharge patient was clinically well-appearing and HDS for outpatient management. The patient and/or family  was educated regarding diagnosis, supportive care, OTC and Rx medications. The patient and/or family was given the opportunity to ask questions prior to discharge.  They verbalized understanding of my discussion of the plans for treatment, expected course, indications to return to  or seek further evaluation in ED, and the need for timely follow up as directed.   They were provided with a work/school excuse if requested.      Orders and Diagnoses  Diagnoses and all orders for this visit:  Right acute serous otitis media, recurrence not specified  -     cefdinir (Omnicef) 300 mg capsule; Take 1 capsule (300 mg) by mouth 2 times a day for 10 days.  Congestion of nasal sinus  -     POCT Influenza A/B manually resulted  -     POCT Covid-19 Rapid Antigen  -     fluticasone (Flonase) 50 mcg/actuation nasal spray; Administer 1 spray into each nostril once daily for 10 days. Shake gently. Before first use, prime pump. After use, clean tip and replace cap.      Medical Admin Record      Patient disposition: Home    Electronically signed by [facility]  9:21 AM

## 2025-01-31 ENCOUNTER — APPOINTMENT (OUTPATIENT)
Dept: PRIMARY CARE | Facility: CLINIC | Age: 70
End: 2025-01-31
Payer: MEDICARE

## 2025-01-31 VITALS
SYSTOLIC BLOOD PRESSURE: 140 MMHG | HEIGHT: 65 IN | RESPIRATION RATE: 16 BRPM | DIASTOLIC BLOOD PRESSURE: 84 MMHG | BODY MASS INDEX: 33.32 KG/M2 | WEIGHT: 200 LBS | TEMPERATURE: 97.4 F | OXYGEN SATURATION: 96 % | HEART RATE: 82 BPM

## 2025-01-31 DIAGNOSIS — J06.9 UPPER RESPIRATORY TRACT INFECTION, UNSPECIFIED TYPE: ICD-10-CM

## 2025-01-31 DIAGNOSIS — R31.29 MICROSCOPIC HEMATURIA: Primary | ICD-10-CM

## 2025-01-31 LAB
POC APPEARANCE, URINE: CLEAR
POC BILIRUBIN, URINE: NEGATIVE
POC BLOOD, URINE: NEGATIVE
POC COLOR, URINE: YELLOW
POC GLUCOSE, URINE: NEGATIVE MG/DL
POC KETONES, URINE: NEGATIVE MG/DL
POC LEUKOCYTES, URINE: ABNORMAL
POC NITRITE,URINE: NEGATIVE
POC PH, URINE: 7.5 PH
POC PROTEIN, URINE: NEGATIVE MG/DL
POC SPECIFIC GRAVITY, URINE: 1.01
POC UROBILINOGEN, URINE: 0.2 EU/DL

## 2025-01-31 PROCEDURE — 1159F MED LIST DOCD IN RCRD: CPT | Performed by: FAMILY MEDICINE

## 2025-01-31 PROCEDURE — 3008F BODY MASS INDEX DOCD: CPT | Performed by: FAMILY MEDICINE

## 2025-01-31 PROCEDURE — 99214 OFFICE O/P EST MOD 30 MIN: CPT | Performed by: FAMILY MEDICINE

## 2025-01-31 PROCEDURE — 1036F TOBACCO NON-USER: CPT | Performed by: FAMILY MEDICINE

## 2025-01-31 PROCEDURE — 81003 URINALYSIS AUTO W/O SCOPE: CPT | Performed by: FAMILY MEDICINE

## 2025-01-31 NOTE — PROGRESS NOTES
"Subjective   Heide Russ is a 69 y.o. female who presents for Follow-up.  HPI    Here for f/up for microhematuria. Cx previously w GBS. Tx amoxil. Urine odor resolved.     Ear inf went to  this wk on abx. Flonase doesn't help much. Still some congestion     Current Outpatient Medications on File Prior to Visit   Medication Sig Dispense Refill    cefdinir (Omnicef) 300 mg capsule Take 1 capsule (300 mg) by mouth 2 times a day for 10 days. 20 capsule 0    omeprazole (PriLOSEC) 20 mg DR capsule Take 1 capsule (20 mg) by mouth once daily. Do not crush or chew. 30 capsule 11    triamterene-hydrochlorothiazid (Dyazide) 37.5-25 mg capsule Take 1 capsule by mouth once daily. 90 capsule 3    fluticasone (Flonase) 50 mcg/actuation nasal spray Administer 1 spray into each nostril once daily for 10 days. Shake gently. Before first use, prime pump. After use, clean tip and replace cap. (Patient not taking: Reported on 1/31/2025) 16 g 0     No current facility-administered medications on file prior to visit.                  Objective   /84   Pulse 82   Temp 36.3 °C (97.4 °F)   Resp 16   Ht 1.651 m (5' 5\")   Wt 90.7 kg (200 lb)   SpO2 96%   BMI 33.28 kg/m²    Physical Exam  General: NAD  HEENT:NCAT, PERRLA, nml OP, b/l TM clearn, NT edema. No ST   Neck: no cervical MARY KATE  Heart: RRR no murmur, no edema   Lungs: CTA b/l, no wheeze or rhonchi   MSK: no c/c/e. nml gait   Skin: warm and dry  Psych: cooperative, appropriate affect  Neuro: speech clear. A&Ox3  Assessment/Plan   Problem List Items Addressed This Visit    None  Visit Diagnoses       Microscopic hematuria    -  Primary  Resolved. No blood on UA today   Small Carlos Manuel, check Cx    Relevant Orders    POCT UA Automated manually resulted (Completed)    Urine Culture    Upper respiratory tract infection, unspecified type      Likely viral inf w secondary AOM   Can stop omnicef early if improved after 1 wk of Tx     Discussed no solid evidence to warrant MVI for " primary prevention

## 2025-02-02 LAB — BACTERIA UR CULT: NORMAL

## 2025-02-07 ENCOUNTER — HOSPITAL ENCOUNTER (OUTPATIENT)
Dept: RADIOLOGY | Facility: CLINIC | Age: 70
Discharge: HOME | End: 2025-02-07
Payer: MEDICARE

## 2025-02-07 DIAGNOSIS — E78.5 HYPERLIPIDEMIA, UNSPECIFIED HYPERLIPIDEMIA TYPE: ICD-10-CM

## 2025-02-07 PROCEDURE — 75571 CT HRT W/O DYE W/CA TEST: CPT

## 2025-04-11 LAB
25(OH)D3+25(OH)D2 SERPL-MCNC: 27 NG/ML (ref 30–100)
CHOLEST SERPL-MCNC: 200 MG/DL
CHOLEST/HDLC SERPL: 3.6 (CALC)
EST. AVERAGE GLUCOSE BLD GHB EST-MCNC: 146 MG/DL
EST. AVERAGE GLUCOSE BLD GHB EST-SCNC: 8.1 MMOL/L
HBA1C MFR BLD: 6.7 % OF TOTAL HGB
HDLC SERPL-MCNC: 55 MG/DL
LDLC SERPL CALC-MCNC: 122 MG/DL (CALC)
NONHDLC SERPL-MCNC: 145 MG/DL (CALC)
TRIGL SERPL-MCNC: 118 MG/DL

## 2025-04-17 ENCOUNTER — APPOINTMENT (OUTPATIENT)
Dept: PRIMARY CARE | Facility: CLINIC | Age: 70
End: 2025-04-17
Payer: MEDICARE

## 2025-04-17 VITALS
DIASTOLIC BLOOD PRESSURE: 86 MMHG | OXYGEN SATURATION: 99 % | TEMPERATURE: 97.3 F | HEART RATE: 70 BPM | WEIGHT: 198 LBS | HEIGHT: 65 IN | BODY MASS INDEX: 32.99 KG/M2 | SYSTOLIC BLOOD PRESSURE: 138 MMHG | RESPIRATION RATE: 16 BRPM

## 2025-04-17 DIAGNOSIS — K21.9 GASTROESOPHAGEAL REFLUX DISEASE WITHOUT ESOPHAGITIS: ICD-10-CM

## 2025-04-17 DIAGNOSIS — E78.5 HYPERLIPIDEMIA, UNSPECIFIED HYPERLIPIDEMIA TYPE: ICD-10-CM

## 2025-04-17 DIAGNOSIS — E55.9 VITAMIN D DEFICIENCY: ICD-10-CM

## 2025-04-17 DIAGNOSIS — E11.9 TYPE 2 DIABETES MELLITUS WITHOUT COMPLICATION, WITHOUT LONG-TERM CURRENT USE OF INSULIN: Primary | ICD-10-CM

## 2025-04-17 PROCEDURE — 3079F DIAST BP 80-89 MM HG: CPT | Performed by: FAMILY MEDICINE

## 2025-04-17 PROCEDURE — 3008F BODY MASS INDEX DOCD: CPT | Performed by: FAMILY MEDICINE

## 2025-04-17 PROCEDURE — G2211 COMPLEX E/M VISIT ADD ON: HCPCS | Performed by: FAMILY MEDICINE

## 2025-04-17 PROCEDURE — 1159F MED LIST DOCD IN RCRD: CPT | Performed by: FAMILY MEDICINE

## 2025-04-17 PROCEDURE — 1036F TOBACCO NON-USER: CPT | Performed by: FAMILY MEDICINE

## 2025-04-17 PROCEDURE — 3075F SYST BP GE 130 - 139MM HG: CPT | Performed by: FAMILY MEDICINE

## 2025-04-17 PROCEDURE — 3044F HG A1C LEVEL LT 7.0%: CPT | Performed by: FAMILY MEDICINE

## 2025-04-17 PROCEDURE — 3050F LDL-C >= 130 MG/DL: CPT | Performed by: FAMILY MEDICINE

## 2025-04-17 PROCEDURE — 99214 OFFICE O/P EST MOD 30 MIN: CPT | Performed by: FAMILY MEDICINE

## 2025-04-17 NOTE — PROGRESS NOTES
"Subjective   Heide Russ is a 69 y.o. female who presents for Follow-up.  HPI    Here for f/up     Stomach issues lately. Taking omeprazole lately had dec to 20 mg and had more belching and bloating.     Gave up candy and fried foods and potato chips. Has been exercising more for the last mo.     Sister w lung ca. Brother w lung ca in his 40s. Did not smoke. Wonders if needs EKG and CXR.     Recently had ear waxed removed, ENT    Hoarseness which she attributes to allergies worse lately. Does not like to take anything     Medications Ordered Prior to Encounter[1]               Objective   /86   Pulse 70   Temp 36.3 °C (97.3 °F)   Resp 16   Ht 1.651 m (5' 5\")   Wt 89.8 kg (198 lb)   SpO2 99%   BMI 32.95 kg/m²    Physical Exam  General: NAD  HEENT:NCAT, PERRLA, nml OP  Neck: no cervical MARY KATE  Heart: RRR no murmur, no edema   Lungs: CTA b/l, no wheeze or rhonchi   GI: abd soft, nontender, nondistended.   MSK: no c/c/e. nml gait   Skin: warm and dry  Psych: cooperative, appropriate affect  Neuro: speech clear. A&Ox3  Assessment/Plan   Problem List Items Addressed This Visit       Vitamin D deficiency  Inc vit D 3 OTC by 1000 international units   Rpt 3 mo    Relevant Orders    Vitamin D 25 hydroxy    Hyperlipidemia  CAC 0 2025  LDL 120s, goal <100   Declines Rx   Will cont w TLC        Other Visit Diagnoses         Type 2 diabetes mellitus without complication, without long-term current use of insulin    -  Primary  New Dx w A1c at 6.7   She's been making significant TLC for the last mo  Will give more time and rpt A1c in 3 mo before starting Rx       Relevant Orders    Hemoglobin A1C                 [1]   Current Outpatient Medications on File Prior to Visit   Medication Sig Dispense Refill    omeprazole (PriLOSEC) 20 mg DR capsule Take 1 capsule (20 mg) by mouth once daily. Do not crush or chew. 30 capsule 11    triamterene-hydrochlorothiazid (Dyazide) 37.5-25 mg capsule Take 1 capsule by mouth once " daily. 90 capsule 3    fluticasone (Flonase) 50 mcg/actuation nasal spray Administer 1 spray into each nostril once daily for 10 days. Shake gently. Before first use, prime pump. After use, clean tip and replace cap. (Patient not taking: Reported on 4/17/2025) 16 g 0     No current facility-administered medications on file prior to visit.

## 2025-04-24 ENCOUNTER — HOSPITAL ENCOUNTER (OUTPATIENT)
Dept: RADIOLOGY | Facility: CLINIC | Age: 70
Discharge: HOME | End: 2025-04-24
Payer: MEDICARE

## 2025-04-24 ENCOUNTER — OFFICE VISIT (OUTPATIENT)
Dept: URGENT CARE | Age: 70
End: 2025-04-24
Payer: MEDICARE

## 2025-04-24 VITALS
HEART RATE: 74 BPM | OXYGEN SATURATION: 98 % | SYSTOLIC BLOOD PRESSURE: 150 MMHG | TEMPERATURE: 97.8 F | DIASTOLIC BLOOD PRESSURE: 89 MMHG | RESPIRATION RATE: 14 BRPM

## 2025-04-24 DIAGNOSIS — S50.02XA CONTUSION OF LEFT ELBOW, INITIAL ENCOUNTER: ICD-10-CM

## 2025-04-24 DIAGNOSIS — S50.312A ABRASION OF LEFT ELBOW, INITIAL ENCOUNTER: ICD-10-CM

## 2025-04-24 DIAGNOSIS — S80.00XA CONTUSION OF KNEE, UNSPECIFIED LATERALITY, INITIAL ENCOUNTER: ICD-10-CM

## 2025-04-24 DIAGNOSIS — S80.00XA CONTUSION OF KNEE, UNSPECIFIED LATERALITY, INITIAL ENCOUNTER: Primary | ICD-10-CM

## 2025-04-24 DIAGNOSIS — S80.219A ABRASION OF KNEE, UNSPECIFIED LATERALITY, INITIAL ENCOUNTER: ICD-10-CM

## 2025-04-24 PROCEDURE — 73564 X-RAY EXAM KNEE 4 OR MORE: CPT | Mod: 50

## 2025-04-24 NOTE — PROGRESS NOTES
Subjective   Patient ID: Heide Russ is a 69 y.o. female. They present today with a chief complaint of fall    Patient disposition: Home    HISTORY OF PRESENT ILLNESS:    Older adult female presents for injuries from an accidental fall yesterday. Tripped and fell ascending concrete stairs chasing granddaughter, hurting both knees and L elbow. L elbow has abrasion but no pain. Both kneecaps sore and swollen with abrasions. Pain overall mild, aggravated going up stairs. Has not done anything for pain or swelling. Denies injury to head, neck, back.    Past Medical History  Allergies as of 04/24/2025    (No Known Allergies)       Prescriptions Prior to Admission[1]     Medical History[2]    Surgical History[3]     reports that she has never smoked. She has never been exposed to tobacco smoke. She has never used smokeless tobacco. She reports that she does not currently use alcohol. She reports that she does not use drugs.    Review of Systems    Negative except as documented in the History of Present Illness.                             Objective    Vitals:    04/24/25 1102   BP: 150/89   Pulse: 74   Resp: 14   Temp: 36.6 °C (97.8 °F)   SpO2: 98%     No LMP recorded. Patient is postmenopausal.      PHYSICAL EXAMINATION:    CONSTITUTIONAL: nontoxic, ambulatory, well-appearing    EYES:  No scleral icterus or orbital trauma noted. No conjunctival injection. PERRLA. EOMI b/l. No nystagmus.    HEENT:  Head and face are unremarkable and atraumatic. Mucous membranes moist. Nares are patent without copious rhinorrhea.  No lymphadenopathy.    CARDIOVASCULAR:  RRR, no m/r/g. Nl S1/S2.     LUE, BLE    *Distal pulses intact, capillary refill 1 second in distal digits.    LUNGS:  CTAB, no r/r/w. No dullness to percussion.    ABDOMEN:  Nonobese, nonscaphoid..    SKIN: Minimal prepatellar swelling bl knees. Abrasions (superficial) on bl prepatellar regions and L elbow. No ecchymosis.    MUSCULOSKELETAL:     LUE, BLE    * ROM is FROM  wo pain all limbs    * Tenderness is present bl patellae, focal. Otherwise NTTP BLE and LUE including at elbow.    * Soft tissue swelling is as above.    Gait is nonantalgic.         NEURO:     LUE, BLE    * Motor function is distally intact    * Sensation is distally intact         PSYCH: Appropriate mood and affect.         ---------------------------------------------------------------         MDM:  XR interpreted by me independently negative for fx nor effusion. L elbow nl exam except abrasion and XR not indicated. Wounds dressed here and ACE applied to both knees. Will fu with PCP or here PRN. Tetanus confirmed UTD 2022.        Procedures    Diagnostic study results (if any) were reviewed by Boris Villalpando PA-C.    No results found for this visit on 04/24/25.     Assessment/Plan   Allergies, medications, history, and pertinent labs/EKGs/Imaging reviewed by Boris Villalpando PA-C.     Orders and Diagnoses  Diagnoses and all orders for this visit:  Contusion of knee, unspecified laterality, initial encounter      Medical Admin Record      Follow Up Instructions  No follow-ups on file.    Electronically signed by Boris Villalpando PA-C  11:08 AM         [1] (Not in a hospital admission)   [2]   Past Medical History:  Diagnosis Date    Colon polyp January 2020    GERD (gastroesophageal reflux disease)     HTN (hypertension)     Presence of urogenital implants     Vaginal pessary present   [3]   Past Surgical History:  Procedure Laterality Date    BREAST SURGERY  2007    Breast reduction    COLONOSCOPY      HYSTERECTOMY  1990    PELVIC FLOOR REPAIR  05/10/2021    Anterior/posterior repair

## 2025-04-29 ENCOUNTER — APPOINTMENT (OUTPATIENT)
Dept: UROLOGY | Facility: CLINIC | Age: 70
End: 2025-04-29
Payer: MEDICARE

## 2025-04-30 ENCOUNTER — OFFICE VISIT (OUTPATIENT)
Dept: DERMATOLOGY | Facility: CLINIC | Age: 70
End: 2025-04-30
Payer: MEDICARE

## 2025-04-30 DIAGNOSIS — D48.5 NEOPLASM OF UNCERTAIN BEHAVIOR OF SKIN: Primary | ICD-10-CM

## 2025-04-30 DIAGNOSIS — L82.1 SEBORRHEIC KERATOSIS: ICD-10-CM

## 2025-04-30 DIAGNOSIS — L30.9 DERMATITIS: ICD-10-CM

## 2025-04-30 DIAGNOSIS — D22.5 MELANOCYTIC NEVUS OF TRUNK: ICD-10-CM

## 2025-04-30 DIAGNOSIS — L57.8 DIFFUSE PHOTODAMAGE OF SKIN: ICD-10-CM

## 2025-04-30 PROCEDURE — 99214 OFFICE O/P EST MOD 30 MIN: CPT | Performed by: DERMATOLOGY

## 2025-04-30 PROCEDURE — 11104 PUNCH BX SKIN SINGLE LESION: CPT | Performed by: DERMATOLOGY

## 2025-04-30 PROCEDURE — 1159F MED LIST DOCD IN RCRD: CPT | Performed by: DERMATOLOGY

## 2025-04-30 RX ORDER — TRIAMCINOLONE ACETONIDE 1 MG/G
CREAM TOPICAL
Qty: 80 G | Refills: 1 | Status: SHIPPED | OUTPATIENT
Start: 2025-04-30

## 2025-04-30 ASSESSMENT — ITCH NUMERIC RATING SCALE: HOW SEVERE IS YOUR ITCHING?: 1

## 2025-04-30 NOTE — Clinical Note
On the patient's posterior neck, the right side worse than the left, there are a few ill-defined erythematous and hyperpigmented, scaly, slightly lichenified, thin plaques

## 2025-04-30 NOTE — Clinical Note
Eczema -flare on posterior neck.  The potentially chronic and intermittently flaring nature of this condition and treatment options were discussed extensively with the patient today.  At this time, I recommend topical steroid therapy with Triamcinolone 0.1% cream, which the patient was instructed to apply twice daily to the affected areas of her posterior neck (avoid face, groin, body folds) for the next 2 weeks, followed by taper to twice daily on weekends only for persistent areas; the patient may repeat treatment in a 2-week burst-and-taper fashion every 6-8 weeks as needed for future flares.  I also emphasized the importance of dry, sensitive skin care, including the use of a mild soap, such as Dove, and frequent and aggressive moisturization, at least twice daily and immediately following showers or baths, with recommended over-the-counter moisturizing creams, such as Eucerin, Cetaphil, Cerave, or Aveeno, or Vaseline or Aquaphor ointments.  The risks, benefits, and side effects of this medication, including possible skin atrophy with overuse of topical steroids, were discussed.  The patient expressed understanding and is in agreement with this plan.

## 2025-04-30 NOTE — Clinical Note
Scattered on the patient's face, neck, trunk, and extremities, there are multiple brown-colored, hyperkeratotic, stuck-on appearing papules of varying size and shape

## 2025-04-30 NOTE — PROGRESS NOTES
"Subjective     Heide Russ is a 69 y.o. female who presents for the following: Suspicious Skin Lesion.  She states she noticed a red chetna on her right upper cheek approximately 5 days ago, which she states has increased in size and now looks like a \"lump.\"  She also notes red, scaly, itchy areas on the back of her neck.  She denies any other new, changing, or concerning skin lesions; no bleeding, itching, or burning lesions.      Review of Systems:  No other skin or systemic complaints other than what is documented elsewhere in the note.    The following portions of the chart were reviewed this encounter and updated as appropriate:       Skin Cancer History  Biopsy Log Book  No skin cancers from Specimen Tracking.    Additional History      Specialty Problems          Dermatology Problems    Dermatitis, unspecified    Hemangioma of skin and subcutaneous tissue    Other melanin hyperpigmentation    Other seborrheic keratosis    Skin changes due to chronic exposure to nonionizing radiation, unspecified    Xerosis cutis       Past Dermatologic / Past Relevant Medical History:    - history of eczema   - no history of skin cancer    Family History:    No family history of melanoma or skin cancer    Social History:    The patient states she enjoys watching her grandchildren, and her birthday is next week    Allergies:  Patient has no known allergies.    Current Medications / CAM's:  Current Medications[1]     Objective   Well appearing patient in no apparent distress; mood and affect are within normal limits.    A full examination was performed including scalp, face, eyes, ears, nose, lips, neck, chest, axillae, abdomen, back, bilateral upper extremities, and bilateral lower extremities. All findings within normal limits unless otherwise noted below.    Assessment/Plan   Skin Exam  1. NEOPLASM OF UNCERTAIN BEHAVIOR OF SKIN  Right Lateral Infra-Orbital Cheek  1.5 cm erythematous, shiny plaque    Lesion biopsy  Type of " biopsy: punch    Informed consent: discussed and consent obtained    Timeout: patient name, date of birth, surgical site, and procedure verified    Anesthesia: the lesion was anesthetized in a standard fashion    Anesthetic:  1% lidocaine w/ epinephrine 1-100,000 local infiltration  Punch size:  3 mm  Suture size:  5-0  Suture type: Prolene (polypropylene)    Suture removal (days):  7  Hemostasis achieved with: suture    Outcome: patient tolerated procedure well    Post-procedure details: sterile dressing applied and wound care instructions given    Dressing type: bandage and petrolatum      Staff Communication: Dermatology Local Anesthesia: 1 % Lidocaine / Epinephrine - Amount:0.5ml  Specimen 1 - Dermatopathology- DERM LAB  Differential Diagnosis: contact dermatitis v arthropod bite v tumid LE v granulomatous rosacea v cyst  Check Margins Yes/No?:    Comments:    Dermpath Lab: Routine Histopathology (formalin-fixed tissue)  2. DERMATITIS  Right Posterior Neck  On the patient's posterior neck, the right side worse than the left, there are a few ill-defined erythematous and hyperpigmented, scaly, slightly lichenified, thin plaques  Eczema -flare on posterior neck.  The potentially chronic and intermittently flaring nature of this condition and treatment options were discussed extensively with the patient today.  At this time, I recommend topical steroid therapy with Triamcinolone 0.1% cream, which the patient was instructed to apply twice daily to the affected areas of her posterior neck (avoid face, groin, body folds) for the next 2 weeks, followed by taper to twice daily on weekends only for persistent areas; the patient may repeat treatment in a 2-week burst-and-taper fashion every 6-8 weeks as needed for future flares.  I also emphasized the importance of dry, sensitive skin care, including the use of a mild soap, such as Dove, and frequent and aggressive moisturization, at least twice daily and immediately  following showers or baths, with recommended over-the-counter moisturizing creams, such as Eucerin, Cetaphil, Cerave, or Aveeno, or Vaseline or Aquaphor ointments.  The risks, benefits, and side effects of this medication, including possible skin atrophy with overuse of topical steroids, were discussed.  The patient expressed understanding and is in agreement with this plan.  triamcinolone (Kenalog) 0.1 % cream - Right Posterior Neck  Apply twice daily to affected areas of neck (avoid face, groin, body folds) for 2 weeks, then taper to twice daily on weekends only; repeat every 6-8 weeks as needed for flares  3. MELANOCYTIC NEVUS OF TRUNK  Generalized  Scattered on the patient's face, neck, trunk, and extremities, there are several small, round- to oval-shaped, brown-pigmented and pink-colored, symmetric, uniform-appearing macules and dome-shaped papules  Clinically benign- to slightly atypical-appearing nevi - the clinically benign- to slightly atypical-appearing nature of the patient's nevi was discussed with the patient today.  None of the patient's nevi meet threshold for biopsy today.  I emphasized the importance of performing monthly self-skin exams using the ABCDs of monitoring moles, which were reviewed with the patient today and an informational hand-out provided.  I also emphasized the importance of sun avoidance and sun protection with daily sunscreen use.  The patient expressed understanding and is in agreement with this plan.  4. SEBORRHEIC KERATOSIS  Generalized  Scattered on the patient's face, neck, trunk, and extremities, there are multiple brown-colored, hyperkeratotic, stuck-on appearing papules of varying size and shape  Seborrheic Keratoses - the benign nature of these lesions was discussed with the patient today and reassurance provided.  No treatment is medically indicated for these lesions at this time.  5. DIFFUSE PHOTODAMAGE OF SKIN  Photodistributed  Diffuse photodamage with actinic  changes with telangiectasia and mottled pigmentation in sun-exposed areas.  Photodamage.  The signs and symptoms of skin cancer were reviewed and the patient was advised to practice sun protection and sun avoidance, use daily sunscreen, and perform regular self skin exams.  Sun protection was discussed, including avoiding the mid-day sun, wearing a sunscreen with SPF at least 50, and stressing the need for reapplication of sunscreen and applying more than they think they need.          [1]   Current Outpatient Medications:     omeprazole (PriLOSEC) 20 mg DR capsule, Take 1 capsule (20 mg) by mouth once daily. Do not crush or chew., Disp: 30 capsule, Rfl: 11    triamterene-hydrochlorothiazid (Dyazide) 37.5-25 mg capsule, Take 1 capsule by mouth once daily., Disp: 90 capsule, Rfl: 3    fluticasone (Flonase) 50 mcg/actuation nasal spray, Administer 1 spray into each nostril once daily for 10 days. Shake gently. Before first use, prime pump. After use, clean tip and replace cap. (Patient not taking: Reported on 4/17/2025), Disp: 16 g, Rfl: 0    triamcinolone (Kenalog) 0.1 % cream, Apply twice daily to affected areas of neck (avoid face, groin, body folds) for 2 weeks, then taper to twice daily on weekends only; repeat every 6-8 weeks as needed for flares, Disp: 80 g, Rfl: 1

## 2025-05-07 ENCOUNTER — APPOINTMENT (OUTPATIENT)
Dept: DERMATOLOGY | Facility: CLINIC | Age: 70
End: 2025-05-07
Payer: MEDICARE

## 2025-05-07 DIAGNOSIS — L30.9 DERMATITIS: ICD-10-CM

## 2025-05-07 DIAGNOSIS — L85.3 XEROSIS CUTIS: ICD-10-CM

## 2025-05-07 DIAGNOSIS — R21 RASH AND OTHER NONSPECIFIC SKIN ERUPTION: ICD-10-CM

## 2025-05-07 DIAGNOSIS — L57.8 DIFFUSE PHOTODAMAGE OF SKIN: ICD-10-CM

## 2025-05-07 DIAGNOSIS — D49.2 NEOPLASM OF SKIN: Primary | ICD-10-CM

## 2025-05-07 PROCEDURE — 99214 OFFICE O/P EST MOD 30 MIN: CPT | Performed by: DERMATOLOGY

## 2025-05-07 PROCEDURE — 1159F MED LIST DOCD IN RCRD: CPT | Performed by: DERMATOLOGY

## 2025-05-07 RX ORDER — METRONIDAZOLE 7.5 MG/G
CREAM TOPICAL
Qty: 45 G | Refills: 11 | Status: SHIPPED | OUTPATIENT
Start: 2025-05-07

## 2025-05-07 NOTE — Clinical Note
Xerosis.  I emphasized the importance of dry, sensitive skin care, including the use of a mild soap, such as Dove, and frequent and aggressive moisturization, at least twice daily and immediately following showers or baths, with recommended over-the-counter moisturizing creams, such as Eucerin, Cetaphil, Cerave, or Aveeno, or Vaseline or Aquaphor ointments.

## 2025-05-07 NOTE — Clinical Note
On the patient's posterior neck, the right side worse than the left, there are a few ill-defined pink to slightly erythematous and hyperpigmented, slightly scaly, slightly lichenified, thin plaques

## 2025-05-07 NOTE — Clinical Note
Rosacea - ***.  The chronic and intermittently flaring nature of this condition and treatment options were discussed extensively with the patient today.  At this time, I recommend topical therapy with ***, which the patient was instructed to apply twice daily to the affected areas of the face.  I also discussed the various triggers of rosacea with the patient today, especially sun exposure, and emphasized the importance of trigger avoidance, particularly sun avoidance and sun protection with daily sunscreen use.  The risks, benefits, and side effects of this medication were discussed.  The patient expressed understanding and is in agreement with this plan.

## 2025-05-07 NOTE — Clinical Note
Eczema -recent flare on posterior neck improving with topical steroid therapy.  The potentially chronic and intermittently flaring nature of this condition and treatment options were discussed extensively with the patient today.  At this time, I recommend she continue topical steroid therapy with Triamcinolone 0.1% cream, which the patient was instructed to apply twice daily to the affected areas of her posterior neck (avoid face, groin, body folds) for another 1-2 weeks, followed by taper to twice daily on weekends only for persistent areas; the patient may repeat treatment in a 2-week burst-and-taper fashion every 6-8 weeks as needed for future flares.  I also emphasized the importance of dry, sensitive skin care, including the use of a mild soap, such as Dove, and frequent and aggressive moisturization, at least twice daily and immediately following showers or baths, with recommended over-the-counter moisturizing creams, such as Eucerin, Cetaphil, Cerave, or Aveeno, or Vaseline or Aquaphor ointments.  The risks, benefits, and side effects of this medication, including possible skin atrophy with overuse of topical steroids, were discussed.  The patient expressed understanding and is in agreement with this plan.

## 2025-05-07 NOTE — Clinical Note
Erythematous plaque - right lateral infra-orbital cheek.  Based on the patient's history, recent exam and repeat exam today, and the histologic findings in her recent biopsy, the favored clinico-pathologic differential diagnosis for this lesion includes possibly rosacea versus connective tissue disease, such as acute cutaneous lupus erythematosus, subacute cutaneous LE, or dermatomyositis, and less likely tumid LE.  The nature of each of these conditions and management options, including possible laboratory work-up for a possible underlying CT disease, were discussed extensively with the patient in the office today.  After discussing the risks and benefits of various management options, the patient expressed understanding and wishes to undergo laboratory work-up.    Thus, I recommend she have an GERBER with ABIGAIL panel, CK, and Aldolase drawn, which we will follow-up.  In the meantime, I recommend empiric topical therapy for possible rosacea with MetroCream 0.75%, which she was instructed to apply twice daily to the affected areas of her face.  I also discussed the various triggers of rosacea with the patient today, especially sun exposure, and emphasized the importance of trigger avoidance, particularly sun avoidance and sun protection with daily sunscreen use.  The risks, benefits, and side effects of this medication were discussed.  The patient expressed understanding and is in agreement with this plan.  Of note, her suture was removed in the office today as well.

## 2025-05-08 LAB
ALDOLASE SERPL-CCNC: 4.9 U/L
ANA SER QL IF: NEGATIVE
CENTROMERE B AB SER-ACNC: NORMAL AI
CK SERPL-CCNC: 195 U/L (ref 18–225)
DSDNA AB SER-ACNC: <1 IU/ML
ENA JO1 AB SER IA-ACNC: NORMAL AI
ENA RNP AB SER-ACNC: NORMAL AI
ENA SCL70 AB SER IA-ACNC: NORMAL AI
ENA SM AB SER IA-ACNC: NORMAL AI
ENA SM+RNP AB SER IA-ACNC: NORMAL AI
ENA SS-A AB SER IA-ACNC: NORMAL AI
ENA SS-B AB SER IA-ACNC: NORMAL AI
HISTONE AB SER IA-ACNC: NORMAL
NUCLEOSOME AB SER IA-ACNC: NORMAL AI
RIBOSOMAL P AB SER-ACNC: NORMAL AI

## 2025-05-08 NOTE — PROGRESS NOTES
"Subjective     Heide Russ is a 70 y.o. female who presents for the following: Discuss recent biopsy result and management options.  She states she noticed a red chetna on her right upper cheek approximately 2 weeks ago, which had increased in size and looked like a \"lump.\"    Punch biopsy of the lesion on her right lateral infraorbital cheek performed at her last visit in our office on 4/30/25 revealed \"superficial and mid perivascular lymphocytic infiltrate with possible basal layer vacuolization,\" the findings of which could be seen in rosacea though a connective tissue disease, such as acute cutaneous lupus erythematosus, subacute cutaneous LE, and dermatomyositis, could not be excluded, and tumid LE was felt to be less likely.      Today, the patient states the biopsy site healed well, although the area is still red and puffy and has not resolved.  She denies any new or similar lesions on her face or any other parts of her skin since her last visit.  She also reports she has been applying triamcinolone 0.1% cream to the back of her neck twice daily since her last visit, which has helped significantly, although the rash has not completely resolved.  She denies any new, changing, or concerning skin lesions since her last visit; no bleeding, itching, or burning lesions.      Review of Systems:  No other skin or systemic complaints other than what is documented elsewhere in the note.    The following portions of the chart were reviewed this encounter and updated as appropriate:       Skin Cancer History  Biopsy Log Book  No skin cancers from Specimen Tracking.    Additional History      Specialty Problems          Dermatology Problems    Dermatitis, unspecified    Hemangioma of skin and subcutaneous tissue    Other melanin hyperpigmentation    Other seborrheic keratosis    Skin changes due to chronic exposure to nonionizing radiation, unspecified    Xerosis cutis       Past Dermatologic / Past Relevant Medical " History:    - history of eczema   - no history of skin cancer    Family History:    No family history of melanoma or skin cancer    Social History:    The patient states she enjoys watching her grandchildren    Allergies:  Patient has no known allergies.    Current Medications / CAM's:  Current Medications[1]     Objective   Well appearing patient in no apparent distress; mood and affect are within normal limits.    A skin examination was performed including the face and neck. All findings within normal limits unless otherwise noted below.    Assessment/Plan   Skin Exam  1. NEOPLASM OF SKIN  Right lateral infraorbital cheek  1.5 cm erythematous, shiny plaque with a central pink, well-healed scar at her recent biopsy site  CK    Aldolase  Erythematous plaque - right lateral infra-orbital cheek.  Based on the patient's history, recent exam and repeat exam today, and the histologic findings in her recent biopsy, the favored clinico-pathologic differential diagnosis for this lesion includes possibly rosacea versus connective tissue disease, such as acute cutaneous lupus erythematosus, subacute cutaneous LE, or dermatomyositis, and less likely tumid LE.  The nature of each of these conditions and management options, including possible laboratory work-up for a possible underlying CT disease, were discussed extensively with the patient in the office today.  After discussing the risks and benefits of various management options, the patient expressed understanding and wishes to undergo laboratory work-up.    Thus, I recommend she have an GERBER with ABIGAIL panel, CK, and Aldolase drawn, which we will follow-up.  In the meantime, I recommend empiric topical therapy for possible rosacea with MetroCream 0.75%, which she was instructed to apply twice daily to the affected areas of her face.  I also discussed the various triggers of rosacea with the patient today, especially sun exposure, and emphasized the importance of trigger  avoidance, particularly sun avoidance and sun protection with daily sunscreen use.  The risks, benefits, and side effects of this medication were discussed.  The patient expressed understanding and is in agreement with this plan.  Of note, her suture was removed in the office today as well.  Related Procedures  GERBER without Reflex ABIGAIL  Anti-SSA  Anti-SSB  Anti-DNA Antibody, Double-Stranded  Anti-SM  Anti-RNP  Anti-Sm/RNP  Anti-PANTERA-1 Antibody IgG  Anti-Scleroderma Antibody  Anti-Centromere  Anti-Chromatin  Ribosomal P Protein Antibody  Anti-Histone Antibodies  2. RASH AND OTHER NONSPECIFIC SKIN ERUPTION  Head - Anterior (Face)  metroNIDAZOLE (Metrocream) 0.75 % cream - Head - Anterior (Face)  Apply twice daily to affected areas of face  3. DERMATITIS  Right Posterior Neck  On the patient's posterior neck, the right side worse than the left, there are a few ill-defined pink to slightly erythematous and hyperpigmented, slightly scaly, slightly lichenified, thin plaques  Eczema -recent flare on posterior neck improving with topical steroid therapy.  The potentially chronic and intermittently flaring nature of this condition and treatment options were discussed extensively with the patient today.  At this time, I recommend she continue topical steroid therapy with Triamcinolone 0.1% cream, which the patient was instructed to apply twice daily to the affected areas of her posterior neck (avoid face, groin, body folds) for another 1-2 weeks, followed by taper to twice daily on weekends only for persistent areas; the patient may repeat treatment in a 2-week burst-and-taper fashion every 6-8 weeks as needed for future flares.  I also emphasized the importance of dry, sensitive skin care, including the use of a mild soap, such as Dove, and frequent and aggressive moisturization, at least twice daily and immediately following showers or baths, with recommended over-the-counter moisturizing creams, such as Eucerin, Cetaphil,  Cerave, or Aveeno, or Vaseline or Aquaphor ointments.  The risks, benefits, and side effects of this medication, including possible skin atrophy with overuse of topical steroids, were discussed.  The patient expressed understanding and is in agreement with this plan.  Related Medications  triamcinolone (Kenalog) 0.1 % cream  Apply twice daily to affected areas of neck (avoid face, groin, body folds) for 2 weeks, then taper to twice daily on weekends only; repeat every 6-8 weeks as needed for flares  4. XEROSIS CUTIS  Generalized  Diffuse generalized dry, scaly skin.  Xerosis.  I emphasized the importance of dry, sensitive skin care, including the use of a mild soap, such as Dove, and frequent and aggressive moisturization, at least twice daily and immediately following showers or baths, with recommended over-the-counter moisturizing creams, such as Eucerin, Cetaphil, Cerave, or Aveeno, or Vaseline or Aquaphor ointments.  5. DIFFUSE PHOTODAMAGE OF SKIN  Photodistributed  Diffuse photodamage with actinic changes with telangiectasia and mottled pigmentation in sun-exposed areas.  Photodamage.  The signs and symptoms of skin cancer were reviewed and the patient was advised to practice sun protection and sun avoidance, use daily sunscreen, and perform regular self skin exams.  Sun protection was discussed, including avoiding the mid-day sun, wearing a sunscreen with SPF at least 50, and stressing the need for reapplication of sunscreen and applying more than they think they need.          [1]   Current Outpatient Medications:     omeprazole (PriLOSEC) 20 mg DR capsule, Take 1 capsule (20 mg) by mouth once daily. Do not crush or chew., Disp: 30 capsule, Rfl: 11    triamcinolone (Kenalog) 0.1 % cream, Apply twice daily to affected areas of neck (avoid face, groin, body folds) for 2 weeks, then taper to twice daily on weekends only; repeat every 6-8 weeks as needed for flares, Disp: 80 g, Rfl: 1     triamterene-hydrochlorothiazid (Dyazide) 37.5-25 mg capsule, Take 1 capsule by mouth once daily., Disp: 90 capsule, Rfl: 3    fluticasone (Flonase) 50 mcg/actuation nasal spray, Administer 1 spray into each nostril once daily for 10 days. Shake gently. Before first use, prime pump. After use, clean tip and replace cap. (Patient not taking: Reported on 4/17/2025), Disp: 16 g, Rfl: 0    metroNIDAZOLE (Metrocream) 0.75 % cream, Apply twice daily to affected areas of face, Disp: 45 g, Rfl: 11

## 2025-05-13 LAB
ALDOLASE SERPL-CCNC: 4.9 U/L
ANA SER QL IF: NEGATIVE
CENTROMERE B AB SER-ACNC: NORMAL AI
CK SERPL-CCNC: 195 U/L (ref 18–225)
DSDNA AB SER-ACNC: <1 IU/ML
ENA JO1 AB SER IA-ACNC: NORMAL AI
ENA RNP AB SER-ACNC: NORMAL AI
ENA SCL70 AB SER IA-ACNC: NORMAL AI
ENA SM AB SER IA-ACNC: NORMAL AI
ENA SM+RNP AB SER IA-ACNC: NORMAL AI
ENA SS-A AB SER IA-ACNC: NORMAL AI
ENA SS-B AB SER IA-ACNC: NORMAL AI
HISTONE AB SER IA-ACNC: <1 U
NUCLEOSOME AB SER IA-ACNC: NORMAL AI
RIBOSOMAL P AB SER-ACNC: NORMAL AI

## 2025-06-04 ENCOUNTER — APPOINTMENT (OUTPATIENT)
Dept: DERMATOLOGY | Facility: CLINIC | Age: 70
End: 2025-06-04
Payer: MEDICARE

## 2025-06-04 DIAGNOSIS — L85.3 XEROSIS CUTIS: ICD-10-CM

## 2025-06-04 DIAGNOSIS — L57.8 DIFFUSE PHOTODAMAGE OF SKIN: ICD-10-CM

## 2025-06-04 DIAGNOSIS — L30.9 DERMATITIS: Primary | ICD-10-CM

## 2025-06-04 PROCEDURE — 99214 OFFICE O/P EST MOD 30 MIN: CPT | Performed by: DERMATOLOGY

## 2025-06-04 RX ORDER — ZOSTER VACCINE RECOMBINANT, ADJUVANTED 50 MCG/0.5
KIT INTRAMUSCULAR
COMMUNITY
Start: 2024-09-19

## 2025-06-04 RX ORDER — HYDROCORTISONE 25 MG/G
CREAM TOPICAL
Qty: 60 G | Refills: 1 | Status: SHIPPED | OUTPATIENT
Start: 2025-06-04

## 2025-06-04 ASSESSMENT — ITCH NUMERIC RATING SCALE: HOW SEVERE IS YOUR ITCHING?: 0

## 2025-06-04 ASSESSMENT — DERMATOLOGY PATIENT ASSESSMENT
ARE YOU ON BIRTH CONTROL: NO
ARE YOU TRYING TO GET PREGNANT: NO
DO YOU HAVE ANY NEW OR CHANGING LESIONS: NO
DO YOU HAVE IRREGULAR MENSTRUAL CYCLES: NO
DO YOU USE SUNSCREEN: OCCASIONALLY
DO YOU USE A TANNING BED: NO

## 2025-06-04 ASSESSMENT — DERMATOLOGY QUALITY OF LIFE (QOL) ASSESSMENT
DATE THE QUALITY-OF-LIFE ASSESSMENT WAS COMPLETED: 67360
WHAT SINGLE SKIN CONDITION LISTED BELOW IS THE PATIENT ANSWERING THE QUALITY-OF-LIFE ASSESSMENT QUESTIONS ABOUT: NONE OF THE ABOVE
RATE HOW BOTHERED YOU ARE BY SYMPTOMS OF YOUR SKIN PROBLEM (EG, ITCHING, STINGING BURNING, HURTING OR SKIN IRRITATION): 0 - NEVER BOTHERED
ARE THERE EXCLUSIONS OR EXCEPTIONS FOR THE QUALITY OF LIFE ASSESSMENT: NO
RATE HOW BOTHERED YOU ARE BY EFFECTS OF YOUR SKIN PROBLEMS ON YOUR ACTIVITIES (EG, GOING OUT, ACCOMPLISHING WHAT YOU WANT, WORK ACTIVITIES OR YOUR RELATIONSHIPS WITH OTHERS): 0 - NEVER BOTHERED
RATE HOW EMOTIONALLY BOTHERED YOU ARE BY YOUR SKIN PROBLEM (FOR EXAMPLE, WORRY, EMBARRASSMENT, FRUSTRATION): 0 - NEVER BOTHERED

## 2025-06-04 ASSESSMENT — PATIENT GLOBAL ASSESSMENT (PGA): PATIENT GLOBAL ASSESSMENT: PATIENT GLOBAL ASSESSMENT:  1 - CLEAR

## 2025-06-04 NOTE — PROGRESS NOTES
"Subjective     Heide Russ is a 70 y.o. female who presents for the following: Eczema.  She states she was on vacation in [place] 1 week ago and used a new sunscreen on her face, and soon after she broke out with red, scaly, itchy areas on her face, especially on her lower cheeks and jawline's.  She states she used her sister's hydrocortisone 2.5% ointment, which helped, but the rash has not completely resolved.  She also switched back to her Cetaphil facial moisturizer with SPF 50, which she thinks also may have helped.  She denies any other areas of involvement.  She denies any other new, changing, or concerning skin lesions since her last visit; no bleeding, itching, or burning lesions.      Review of Systems:  No other skin or systemic complaints other than what is documented elsewhere in the note.    The following portions of the chart were reviewed this encounter and updated as appropriate:       Skin Cancer History  Biopsy Log Book  No skin cancers from Specimen Tracking.    Additional History      Specialty Problems          Dermatology Problems    Dermatitis, unspecified    Hemangioma of skin and subcutaneous tissue    Other melanin hyperpigmentation    Other seborrheic keratosis    Skin changes due to chronic exposure to nonionizing radiation, unspecified    Xerosis cutis       Past Dermatologic / Past Relevant Medical History:    - history of eczema   - no history of skin cancer    Family History:    No family history of melanoma or skin cancer    Social History:    The patient states she enjoys watching her grandchildren and recently returned from vacation in [place]; she states she pronounces her last name like \"kyle\"    Allergies:  Patient has no known allergies.    Current Medications / CAM's:  Current Medications[1]     Objective   Well appearing patient in no apparent distress; mood and affect are within normal limits.    A skin examination was performed including: Face, neck, and chest. All " findings within normal limits unless otherwise noted below.    Assessment/Plan   Skin Exam  1. DERMATITIS  Head - Anterior (Face)  On the patient's bilateral face, mainly her bilateral lower cheeks and jawlines, there are a few similar-appearing erythematous and hyperpigmented, slightly scaly patches and thin plaques as well as several hyperpigmented patches consistent with postinflammatory hyperpigmentation  Eczema, possibly contact dermatitis, possibly allergic contact dermatitis - flare on face, mainly bilateral lower cheeks and jawlines.  The potentially chronic and intermittently flaring nature of this condition and treatment options were discussed extensively with the patient today.  At this time, I recommend topical steroid therapy with Hydrocortisone 2.5% cream, which the patient was instructed to apply twice daily to the affected areas of her face (avoid the eyes) for the next 1-2 weeks, followed by taper to twice daily on weekends only for persistent areas; the patient may repeat treatment in a 2-week burst-and-taper fashion every 6-8 weeks as needed for future flares.  I also emphasized the importance of dry, sensitive skin care, including the use of a mild soap, such as Dove, and frequent and aggressive moisturization, at least twice daily and immediately following showers or baths, with recommended over-the-counter moisturizing creams, such as Eucerin, Cetaphil, Cerave, or Aveeno, or Vaseline or Aquaphor ointments.  The risks, benefits, and side effects of this medication, including possible skin atrophy with overuse of topical steroids, were discussed.  The patient expressed understanding and is in agreement with this plan.  hydrocortisone 2.5 % cream - Head - Anterior (Face)  Apply twice daily to affected areas of face (avoid eyes) for 2 weeks, then taper to twice daily on weekends only; repeat every 6-8 weeks as needed for flares  Related Medications  triamcinolone (Kenalog) 0.1 % cream  Apply twice  daily to affected areas of neck (avoid face, groin, body folds) for 2 weeks, then taper to twice daily on weekends only; repeat every 6-8 weeks as needed for flares  2. XEROSIS CUTIS  Generalized  Diffuse generalized dry, scaly skin.  Xerosis.  I emphasized the importance of dry, sensitive skin care, including the use of a mild soap, such as Dove, and frequent and aggressive moisturization, at least twice daily and immediately following showers or baths, with recommended over-the-counter moisturizing creams, such as Eucerin, Cetaphil, Cerave, or Aveeno, or Vaseline or Aquaphor ointments.  3. DIFFUSE PHOTODAMAGE OF SKIN  Photodistributed  Diffuse photodamage with actinic changes with telangiectasia and mottled pigmentation in sun-exposed areas.  Photodamage.  The signs and symptoms of skin cancer were reviewed and the patient was advised to practice sun protection and sun avoidance, use daily sunscreen, and perform regular self skin exams.  Sun protection was discussed, including avoiding the mid-day sun, wearing a sunscreen with SPF at least 50, and stressing the need for reapplication of sunscreen and applying more than they think they need.          [1]   Current Outpatient Medications:     Shingrix, PF, 50 mcg/0.5 mL vaccine, , Disp: , Rfl:     fluticasone (Flonase) 50 mcg/actuation nasal spray, Administer 1 spray into each nostril once daily for 10 days. Shake gently. Before first use, prime pump. After use, clean tip and replace cap. (Patient not taking: Reported on 4/17/2025), Disp: 16 g, Rfl: 0    hydrocortisone 2.5 % cream, Apply twice daily to affected areas of face (avoid eyes) for 2 weeks, then taper to twice daily on weekends only; repeat every 6-8 weeks as needed for flares, Disp: 60 g, Rfl: 1    metroNIDAZOLE (Metrocream) 0.75 % cream, Apply twice daily to affected areas of face, Disp: 45 g, Rfl: 11    omeprazole (PriLOSEC) 20 mg DR capsule, Take 1 capsule (20 mg) by mouth once daily. Do not crush or  chew., Disp: 30 capsule, Rfl: 11    triamcinolone (Kenalog) 0.1 % cream, Apply twice daily to affected areas of neck (avoid face, groin, body folds) for 2 weeks, then taper to twice daily on weekends only; repeat every 6-8 weeks as needed for flares, Disp: 80 g, Rfl: 1    triamterene-hydrochlorothiazid (Dyazide) 37.5-25 mg capsule, Take 1 capsule by mouth once daily., Disp: 90 capsule, Rfl: 3

## 2025-06-04 NOTE — Clinical Note
Eczema, possibly contact dermatitis, possibly allergic contact dermatitis - flare on face, mainly bilateral lower cheeks and jawlines.  The potentially chronic and intermittently flaring nature of this condition and treatment options were discussed extensively with the patient today.  At this time, I recommend topical steroid therapy with Hydrocortisone 2.5% cream, which the patient was instructed to apply twice daily to the affected areas of her face (avoid the eyes) for the next 1-2 weeks, followed by taper to twice daily on weekends only for persistent areas; the patient may repeat treatment in a 2-week burst-and-taper fashion every 6-8 weeks as needed for future flares.  I also emphasized the importance of dry, sensitive skin care, including the use of a mild soap, such as Dove, and frequent and aggressive moisturization, at least twice daily and immediately following showers or baths, with recommended over-the-counter moisturizing creams, such as Eucerin, Cetaphil, Cerave, or Aveeno, or Vaseline or Aquaphor ointments.  The risks, benefits, and side effects of this medication, including possible skin atrophy with overuse of topical steroids, were discussed.  The patient expressed understanding and is in agreement with this plan.

## 2025-06-04 NOTE — Clinical Note
On the patient's bilateral face, mainly her bilateral lower cheeks and jawlines, there are a few similar-appearing erythematous and hyperpigmented, slightly scaly patches and thin plaques as well as several hyperpigmented patches consistent with postinflammatory hyperpigmentation

## 2025-07-08 ENCOUNTER — APPOINTMENT (OUTPATIENT)
Dept: OBSTETRICS AND GYNECOLOGY | Facility: CLINIC | Age: 70
End: 2025-07-08
Payer: MEDICARE

## 2025-07-08 VITALS
SYSTOLIC BLOOD PRESSURE: 126 MMHG | BODY MASS INDEX: 32.49 KG/M2 | HEIGHT: 65 IN | DIASTOLIC BLOOD PRESSURE: 82 MMHG | WEIGHT: 195 LBS

## 2025-07-08 DIAGNOSIS — Z12.31 ENCOUNTER FOR SCREENING MAMMOGRAM FOR BREAST CANCER: ICD-10-CM

## 2025-07-08 DIAGNOSIS — Z01.419 ENCOUNTER FOR WELL WOMAN EXAM WITH ROUTINE GYNECOLOGICAL EXAM: Primary | ICD-10-CM

## 2025-07-08 PROCEDURE — G0101 CA SCREEN;PELVIC/BREAST EXAM: HCPCS | Performed by: OBSTETRICS & GYNECOLOGY

## 2025-07-08 PROCEDURE — 1160F RVW MEDS BY RX/DR IN RCRD: CPT | Performed by: OBSTETRICS & GYNECOLOGY

## 2025-07-08 PROCEDURE — 1159F MED LIST DOCD IN RCRD: CPT | Performed by: OBSTETRICS & GYNECOLOGY

## 2025-07-08 PROCEDURE — 1036F TOBACCO NON-USER: CPT | Performed by: OBSTETRICS & GYNECOLOGY

## 2025-07-08 PROCEDURE — 3008F BODY MASS INDEX DOCD: CPT | Performed by: OBSTETRICS & GYNECOLOGY

## 2025-07-08 NOTE — PROGRESS NOTES
"  ASSESSMENT/PLAN    1. Encounter for well woman exam with routine gynecological exam (Primary)  Routine well woman visit.   Your exam was normal today.   A pap test was not done. Prior hysterectomy      2. Encounter for screening mammogram for breast cancer  Your clinical breast exam was normal.   A screening mammogram order has been placed for 2025.         ---------------------------------------------------------------------------------    SUBJECTIVE    PAP:  THINKS. Cryo in , normal since. Hysterectomy , prolapse. ?laparoscopic, has one ovary.  MAMMO 2024  DEXA 10-8-24 T=-1.7  COLON 2024     HPI    69 yo  presents for routine well woman visit.   Last visit as new patient to Dr. Keene 2023. Previous pt Dr. Aj.  H/o MAE approx .   sacrospinous ligament suspension, anterior repair, perineorrhaphy for vaginal vault prolapse. Dr. Hernandez.   Notes occasional urinary odor, dark color.    breast reduction. Last mammogram 2024.   Menopausal age approximately age 49.   , nonsmoker, no ETOH.    REVIEW OF SYSTEMS    Constitutional: no recent weight gain, no fatigue.   ENT: no hearing loss.  Cardiovascular: no chest pain, no palpitations.  Respiratory: no shortness of breath.  Gastrointestinal: no abdominal pain, no constipation, no nausea, no diarrhea.  Musculoskeletal: no back pain.  Lymphatic: no swollen glands.  Genitourinary: no pelvic pain, no urinary urgency, no urinary incontinence, no change in urinary frequency, no vaginal dryness, no vaginal itching,  no vaginal discharge, no unexplained vaginal bleeding, no lesion/sore.   Breasts: no breast pain, no nipple discharge, no breast lump.   Neurological: no headache, no numbness, no dizziness.   Psychiatric: no sleep disturbances, no anxiety, no depression.   Endocrine: no hot flashes, no loss of hair, no hirsutism.       OBJECTIVE    /82   Ht 1.651 m (5' 5\")   Wt 88.5 kg (195 lb)   BMI 32.45 kg/m²  "     Physical Exam     Constitutional: Alert and in no acute distress. Well developed, well nourished   Head and Face: Head and face: normal   Eyes: Normal external exam - nonicteric sclera.  Ears, Nose, Mouth, and Throat: External inspection of ears and nose: normal   Neck: no neck asymmetry. Supple and thyroid not enlarged and there were no palpable thyroid nodules   Cardiovascular: Heart rate and rhythm were normal  Pulmonary: No respiratory distress and clear bilateral breath sounds   Chest: Breasts: normal appearance, bilateral surgical reduction scars. no nipple discharge, no skin changes. Palpation of breasts and axillae: no palpable mass, no axillary lymphadenopathy   Abdomen: soft nontender; no abdominal mass palpated, no organomegaly and no hernias   Genitourinary: external genitalia: normal appearing vulva and labia without lesions. No inguinal lymphadenopathy,    Urethra: normal.   Bladder: normal on palpation.   Perianal area: normal   Vagina: normal, without significant discharge, no lesions. Well supported vaginal cuff, vaginal wall prolapse.   Cervix and uterus absent.   Right and left adnexa/parametria: Normal  Skin: normal skin color and pigmentation, normal skin turgor, and no rash  Psychiatric: alert and oriented x 3., affect normal to patient baseline and mood: appropriate        Lucille Lino MD

## 2025-07-21 ENCOUNTER — APPOINTMENT (OUTPATIENT)
Dept: PRIMARY CARE | Facility: CLINIC | Age: 70
End: 2025-07-21
Payer: MEDICARE

## 2025-07-31 LAB
25(OH)D3+25(OH)D2 SERPL-MCNC: 34 NG/ML (ref 30–100)
EST. AVERAGE GLUCOSE BLD GHB EST-MCNC: 146 MG/DL
EST. AVERAGE GLUCOSE BLD GHB EST-SCNC: 8.1 MMOL/L
HBA1C MFR BLD: 6.7 %

## 2025-08-01 ENCOUNTER — APPOINTMENT (OUTPATIENT)
Dept: PRIMARY CARE | Facility: CLINIC | Age: 70
End: 2025-08-01
Payer: MEDICARE

## 2025-08-01 VITALS
WEIGHT: 195 LBS | DIASTOLIC BLOOD PRESSURE: 88 MMHG | OXYGEN SATURATION: 100 % | HEIGHT: 65 IN | BODY MASS INDEX: 32.49 KG/M2 | SYSTOLIC BLOOD PRESSURE: 140 MMHG | HEART RATE: 75 BPM | TEMPERATURE: 97.6 F | RESPIRATION RATE: 16 BRPM

## 2025-08-01 DIAGNOSIS — E11.9 TYPE 2 DIABETES MELLITUS WITHOUT COMPLICATION, WITHOUT LONG-TERM CURRENT USE OF INSULIN: ICD-10-CM

## 2025-08-01 DIAGNOSIS — R30.0 DYSURIA: Primary | ICD-10-CM

## 2025-08-01 DIAGNOSIS — N39.0 URINARY TRACT INFECTION WITHOUT HEMATURIA, SITE UNSPECIFIED: ICD-10-CM

## 2025-08-01 DIAGNOSIS — K21.9 GASTROESOPHAGEAL REFLUX DISEASE WITHOUT ESOPHAGITIS: ICD-10-CM

## 2025-08-01 DIAGNOSIS — R03.0 BORDERLINE BLOOD PRESSURE: ICD-10-CM

## 2025-08-01 LAB
POC APPEARANCE, URINE: CLEAR
POC BILIRUBIN, URINE: NEGATIVE
POC BLOOD, URINE: NEGATIVE
POC COLOR, URINE: YELLOW
POC GLUCOSE, URINE: NEGATIVE MG/DL
POC KETONES, URINE: NEGATIVE MG/DL
POC LEUKOCYTES, URINE: ABNORMAL
POC NITRITE,URINE: NEGATIVE
POC PH, URINE: 6 PH
POC PROTEIN, URINE: NEGATIVE MG/DL
POC SPECIFIC GRAVITY, URINE: 1.02
POC UROBILINOGEN, URINE: 1 EU/DL

## 2025-08-01 PROCEDURE — 1159F MED LIST DOCD IN RCRD: CPT | Performed by: FAMILY MEDICINE

## 2025-08-01 PROCEDURE — 1036F TOBACCO NON-USER: CPT | Performed by: FAMILY MEDICINE

## 2025-08-01 PROCEDURE — 3079F DIAST BP 80-89 MM HG: CPT | Performed by: FAMILY MEDICINE

## 2025-08-01 PROCEDURE — 3077F SYST BP >= 140 MM HG: CPT | Performed by: FAMILY MEDICINE

## 2025-08-01 PROCEDURE — 99214 OFFICE O/P EST MOD 30 MIN: CPT | Performed by: FAMILY MEDICINE

## 2025-08-01 PROCEDURE — G2211 COMPLEX E/M VISIT ADD ON: HCPCS | Performed by: FAMILY MEDICINE

## 2025-08-01 PROCEDURE — 81003 URINALYSIS AUTO W/O SCOPE: CPT | Performed by: FAMILY MEDICINE

## 2025-08-01 PROCEDURE — 3008F BODY MASS INDEX DOCD: CPT | Performed by: FAMILY MEDICINE

## 2025-08-01 RX ORDER — ESTRADIOL 0.1 MG/G
2 CREAM VAGINAL DAILY
COMMUNITY

## 2025-08-01 RX ORDER — CHOLECALCIFEROL (VITAMIN D3) 25 MCG
50 TABLET ORAL DAILY
COMMUNITY

## 2025-08-01 RX ORDER — NITROFURANTOIN 25; 75 MG/1; MG/1
100 CAPSULE ORAL 2 TIMES DAILY
Qty: 10 CAPSULE | Refills: 0 | Status: SHIPPED | OUTPATIENT
Start: 2025-08-01 | End: 2025-08-06

## 2025-08-01 ASSESSMENT — PATIENT HEALTH QUESTIONNAIRE - PHQ9
2. FEELING DOWN, DEPRESSED OR HOPELESS: NOT AT ALL
SUM OF ALL RESPONSES TO PHQ9 QUESTIONS 1 AND 2: 0
1. LITTLE INTEREST OR PLEASURE IN DOING THINGS: NOT AT ALL

## 2025-08-01 NOTE — PROGRESS NOTES
"Subjective   Heide Russ is a 70 y.o. female who presents for Follow-up and urinary pressure (Noticed on 7/31/2025).  HPI    Here for f/up w recent DM2. Very active. Dec sweets and portions and fried foods. Does tend to eat carbs.     [place] vit D3 2000     Lower bladder pressure. Not using estrogen cream as [place].     Medications Ordered Prior to Encounter[1]               Objective   /88   Pulse 75   Temp 36.4 °C (97.6 °F)   Resp 16   Ht 1.651 m (5' 5\")   Wt 88.5 kg (195 lb)   SpO2 100%   BMI 32.45 kg/m²    Physical Exam  General: NAD  HEENT:NCAT, PERRLA, nml OP  Neck: no cervical MARY KATE  Heart: RRR no murmur, no edema   Lungs: CTA b/l, no wheeze or rhonchi   GI: abd soft, nontender, nondistended.   MSK: no c/c/e. nml gait   Skin: warm and dry  Psych: cooperative, appropriate affect  Neuro: speech clear. A&Ox3  Assessment/Plan   Problem List Items Addressed This Visit    None  Visit Diagnoses         Dysuria    -  Primary    Relevant Orders    POCT UA Automated manually resulted (Completed)      Type 2 diabetes mellitus without complication, without long-term current use of insulin      A1c stable 6.7  Offered metformin vs TLC but since Motivated to make TLC, will hold off on Rx   Rpt in 3 mo  UalB today       Relevant Orders    Albumin-Creatinine Ratio, Urine Random    Hemoglobin A1C    Basic Metabolic Panel      Urinary tract infection without hematuria, site unspecified      Tx macrobid   Await Cx  Since 2nd UTI in last 6 mo rec to restart regular use vaginal E2 3x/wk       Relevant Medications    nitrofurantoin, macrocrystal-monohydrate, (Macrobid) 100 mg capsule    Other Relevant Orders    Urine Culture      Gastroesophageal reflux disease without esophagitis      Tiral off PPI x 2 wk since made diet changes  If breakthrough Sx occur, restart PPI     Borderline BP:  rec monitoring at home  Call if BP >140/90                    [1]   Current Outpatient " Medications on File Prior to Visit   Medication Sig Dispense Refill    cholecalciferol (Vitamin D3) 25 mcg (1,000 units) tablet Take 2 tablets (50 mcg) by mouth once daily.      estradiol (Estrace) 0.01 % (0.1 mg/gram) vaginal cream Insert 0.5 Applicatorfuls (2 g) into the vagina once daily.      hydrocortisone 2.5 % cream Apply twice daily to affected areas of face (avoid eyes) for 2 weeks, then taper to twice daily on weekends only; repeat every 6-8 weeks as needed for flares 60 g 1    metroNIDAZOLE (Metrocream) 0.75 % cream Apply twice daily to affected areas of face 45 g 11    omeprazole (PriLOSEC) 20 mg DR capsule Take 1 capsule (20 mg) by mouth once daily. Do not crush or chew. 30 capsule 11    triamcinolone (Kenalog) 0.1 % cream Apply twice daily to affected areas of neck (avoid face, groin, body folds) for 2 weeks, then taper to twice daily on weekends only; repeat every 6-8 weeks as needed for flares 80 g 1    triamterene-hydrochlorothiazid (Dyazide) 37.5-25 mg capsule Take 1 capsule by mouth once daily. 90 capsule 3     No current facility-administered medications on file prior to visit.

## 2025-08-03 LAB
ALBUMIN/CREAT UR: NORMAL
BACTERIA UR CULT: NORMAL
CREAT UR-MCNC: NORMAL MG/DL
MICROALBUMIN UR-MCNC: NORMAL

## 2025-08-04 LAB
ALBUMIN/CREAT UR: 3 MG/G CREAT
BACTERIA UR CULT: NORMAL
CREAT UR-MCNC: 129 MG/DL (ref 20–275)
MICROALBUMIN UR-MCNC: 0.4 MG/DL

## 2025-08-30 ENCOUNTER — OFFICE VISIT (OUTPATIENT)
Dept: URGENT CARE | Age: 70
End: 2025-08-30
Payer: MEDICARE

## 2025-08-30 VITALS
DIASTOLIC BLOOD PRESSURE: 92 MMHG | RESPIRATION RATE: 18 BRPM | TEMPERATURE: 97.9 F | SYSTOLIC BLOOD PRESSURE: 184 MMHG | HEART RATE: 68 BPM | OXYGEN SATURATION: 99 %

## 2025-08-30 DIAGNOSIS — B34.8 RHINOVIRUS: Primary | ICD-10-CM

## 2025-08-30 DIAGNOSIS — J02.9 SORE THROAT: ICD-10-CM

## 2025-08-30 LAB
POC HUMAN RHINOVIRUS PCR: POSITIVE
POC INFLUENZA A VIRUS PCR: NEGATIVE
POC INFLUENZA B VIRUS PCR: NEGATIVE
POC RESPIRATORY SYNCYTIAL VIRUS PCR: NEGATIVE
POC STREPTOCOCCUS PYOGENES (GROUP A STREP) PCR: NEGATIVE

## 2025-08-30 PROCEDURE — 1036F TOBACCO NON-USER: CPT | Performed by: STUDENT IN AN ORGANIZED HEALTH CARE EDUCATION/TRAINING PROGRAM

## 2025-08-30 PROCEDURE — 87651 STREP A DNA AMP PROBE: CPT | Performed by: STUDENT IN AN ORGANIZED HEALTH CARE EDUCATION/TRAINING PROGRAM

## 2025-08-30 PROCEDURE — 99213 OFFICE O/P EST LOW 20 MIN: CPT | Performed by: STUDENT IN AN ORGANIZED HEALTH CARE EDUCATION/TRAINING PROGRAM

## 2025-08-30 PROCEDURE — 87631 RESP VIRUS 3-5 TARGETS: CPT | Performed by: STUDENT IN AN ORGANIZED HEALTH CARE EDUCATION/TRAINING PROGRAM

## 2025-08-30 PROCEDURE — 1159F MED LIST DOCD IN RCRD: CPT | Performed by: STUDENT IN AN ORGANIZED HEALTH CARE EDUCATION/TRAINING PROGRAM

## 2025-08-30 RX ORDER — METHYLPREDNISOLONE 4 MG/1
TABLET ORAL
Qty: 21 TABLET | Refills: 0 | Status: SHIPPED | OUTPATIENT
Start: 2025-08-30 | End: 2025-09-05

## 2025-08-30 ASSESSMENT — ENCOUNTER SYMPTOMS
GASTROINTESTINAL NEGATIVE: 1
SORE THROAT: 1
COUGH: 0
CARDIOVASCULAR NEGATIVE: 1
RHINORRHEA: 1
FEVER: 0

## 2025-11-04 ENCOUNTER — APPOINTMENT (OUTPATIENT)
Dept: PRIMARY CARE | Facility: CLINIC | Age: 70
End: 2025-11-04
Payer: MEDICARE

## 2026-01-16 ENCOUNTER — APPOINTMENT (OUTPATIENT)
Dept: PRIMARY CARE | Facility: CLINIC | Age: 71
End: 2026-01-16
Payer: MEDICARE